# Patient Record
Sex: FEMALE | Race: WHITE
[De-identification: names, ages, dates, MRNs, and addresses within clinical notes are randomized per-mention and may not be internally consistent; named-entity substitution may affect disease eponyms.]

---

## 2019-06-30 ENCOUNTER — HOSPITAL ENCOUNTER (EMERGENCY)
Dept: HOSPITAL 97 - ER | Age: 64
LOS: 1 days | Discharge: HOME | End: 2019-07-01
Payer: COMMERCIAL

## 2019-06-30 DIAGNOSIS — Z88.6: ICD-10-CM

## 2019-06-30 DIAGNOSIS — M25.511: Primary | ICD-10-CM

## 2019-06-30 DIAGNOSIS — Z88.5: ICD-10-CM

## 2019-06-30 LAB
HCT VFR BLD CALC: 37.6 % (ref 36–45)
INR BLD: 0.91
LYMPHOCYTES # SPEC AUTO: 3.9 K/UL (ref 0.7–4.9)
PMV BLD: 7.3 FL (ref 7.6–11.3)
RBC # BLD: 4.33 M/UL (ref 3.86–4.86)

## 2019-06-30 PROCEDURE — 83735 ASSAY OF MAGNESIUM: CPT

## 2019-06-30 PROCEDURE — 71045 X-RAY EXAM CHEST 1 VIEW: CPT

## 2019-06-30 PROCEDURE — 80076 HEPATIC FUNCTION PANEL: CPT

## 2019-06-30 PROCEDURE — 80048 BASIC METABOLIC PNL TOTAL CA: CPT

## 2019-06-30 PROCEDURE — 85025 COMPLETE CBC W/AUTO DIFF WBC: CPT

## 2019-06-30 PROCEDURE — 93005 ELECTROCARDIOGRAM TRACING: CPT

## 2019-06-30 PROCEDURE — 83880 ASSAY OF NATRIURETIC PEPTIDE: CPT

## 2019-06-30 PROCEDURE — 36415 COLL VENOUS BLD VENIPUNCTURE: CPT

## 2019-06-30 PROCEDURE — 99284 EMERGENCY DEPT VISIT MOD MDM: CPT

## 2019-06-30 PROCEDURE — 85610 PROTHROMBIN TIME: CPT

## 2019-06-30 PROCEDURE — 84484 ASSAY OF TROPONIN QUANT: CPT

## 2019-07-01 LAB
ALBUMIN SERPL BCP-MCNC: 4 G/DL (ref 3.4–5)
ALP SERPL-CCNC: 68 U/L (ref 45–117)
ALT SERPL W P-5'-P-CCNC: 19 U/L (ref 12–78)
AST SERPL W P-5'-P-CCNC: 18 U/L (ref 15–37)
BUN BLD-MCNC: 14 MG/DL (ref 7–18)
GLUCOSE SERPLBLD-MCNC: 262 MG/DL (ref 74–106)
MAGNESIUM SERPL-MCNC: 2.3 MG/DL (ref 1.8–2.4)
NT-PROBNP SERPL-MCNC: 65 PG/ML (ref ?–125)
POTASSIUM SERPL-SCNC: 4.6 MMOL/L (ref 3.5–5.1)
TROPONIN (EMERG DEPT USE ONLY): < 0.02 NG/ML (ref 0–0.04)

## 2019-07-01 NOTE — RAD REPORT
EXAM DESCRIPTION:  Shoulder  Right 2 View - 6/30/2019 11:44 pm

 

CLINICAL HISTORY:  Right shoulder pain, suspected rotator cuff tear

 

COMPARISON:  None.

 

TECHNIQUE:  Internal and external rotation views of the right shoulder were obtained.

 

FINDINGS:  There is no fracture or dislocation.  AC joint degenerative changes are present mild for a
ge. Degenerative changes seen along the undersurface of the acromion. Acromial humeral joint space is
 normal. There are faint calcifications likely related to a calcific tendinitis. Minimal spurring is 
seen at the greater tuberosity. No acute or suspicious findings.

 

IMPRESSION:  Right shoulder degenerative changes are present without an acute finding seen.

 

Concerns for rotator cuff tear can be addressed with MR imaging.

## 2019-07-01 NOTE — EKG
Test Date:    2019-06-30               Test Time:    23:23:14

Technician:   MG                                     

                                                     

MEASUREMENT RESULTS:                                       

Intervals:                                           

Rate:         82                                     

NJ:           210                                    

QRSD:         78                                     

QT:           382                                    

QTc:          446                                    

Axis:                                                

P:            49                                     

NJ:           210                                    

QRS:          -24                                    

T:            30                                     

                                                     

INTERPRETIVE STATEMENTS:                                       

                                                     

Sinus rhythm with 1st degree AV block

Septal infarct, age undetermined

Abnormal ECG

Compared to ECG 12/11/1999 20:45:00

First degree AV block now present

Myocardial infarct finding now present



Electronically Signed On 07-01-19 19:40:41 CDT by José Manuel Marcelo

## 2019-07-01 NOTE — EDPHYS
Physician Documentation                                                                           

 [facility]                                                                 

Name: Lata Lal                                                                             

Age: 63 yrs                                                                                       

Sex: Female                                                                                       

: 1955                                                                                   

MRN: [id number]                                                                                   

Arrival Date: 2019                                                                          

Time: 22:50                                                                                       

Account#: [id number]                                                                            

Bed 26                                                                                            

Private MD: Gerald Rios R                                                                       

ED Physician Beny Salas                                                                       

HPI:                                                                                              

                                                                                             

23:25 This 63 yrs old  Female presents to ER via Ambulatory with complaints of Right pm1 

      Shoulder pain.                                                                              

23:25 The patient or guardian complains of pain, that is acute. The complaints affect the     pm1 

      anterior aspect of right shoulder. Context: The problem was sustained at home, resulted     

      from attempting to spank grandchild. Onset: The symptoms/episode began/occurred today.      

      Treatment prior to arrival includes: no previous treatment. Modifying factors: The          

      symptoms are alleviated by remaining still, the symptoms are aggravated by movement.        

      Associated signs and symptoms: Pertinent negatives: deformity, fever, numbness,             

      swelling, tingling. Severity of symptoms: in the emergency department the symptoms are      

      unchanged. The patient has experienced similar episodes in the past, prior history of       

      injured bilateral rotator cuffs. The patient has not recently seen a physician.             

                                                                                                  

Historical:                                                                                       

- Allergies:                                                                                      

23:15 Morphine;                                                                               mg2 

23:15 Tramadol HCl;                                                                           mg2 

23:15 morphine derivatives;                                                                   mg2 

- PMHx:                                                                                           

23:15 Diabetes;                                                                               mg2 

- PSHx:                                                                                           

23:15 Carpal Tunnel Repair; ;                                                        mg2 

                                                                                                  

- Immunization history:: Flu vaccine is up to date.                                               

- Social history:: Smoking status: Patient/guardian denies using tobacco,                         

  Patient/guardian denies using alcohol, street drugs, IV drugs.                                  

- Ebola Screening: : No symptoms or risks identified at this time.                                

                                                                                                  

                                                                                                  

ROS:                                                                                              

23:25 Constitutional: Negative for fever, chills, and weight loss, Eyes: Negative for injury, pm1 

      pain, redness, and discharge, ENT: Negative for injury, pain, and discharge, Neck:          

      Negative for injury, pain, and swelling, Cardiovascular: Negative for chest pain,           

      palpitations, and edema, Respiratory: Negative for shortness of breath, cough,              

      wheezing, and pleuritic chest pain, Abdomen/GI: Negative for abdominal pain, nausea,        

      vomiting, diarrhea, and constipation, Back: Negative for injury and pain, : Negative      

      for injury, bleeding, discharge, and swelling, MS/Extremity: Negative for injury and        

      deformity, Skin: Negative for injury, rash, and discoloration.                              

23:25 Neuro: Positive for dizziness, for the past month with exertion, Negative for headache,     

      numbness, tingling.                                                                         

                                                                                                  

Exam:                                                                                             

23:25 Constitutional:  This is a well developed, well nourished patient who is awake, alert,  pm1 

      and in no acute distress. Head/Face:  Normocephalic, atraumatic. Eyes:  Pupils equal        

      round and reactive to light, extra-ocular motions intact.  Lids and lashes normal.          

      Conjunctiva and sclera are non-icteric and not injected.  Cornea within normal limits.      

      Periorbital areas with no swelling, redness, or edema. ENT:  Nares patent. No nasal         

      discharge, no septal abnormalities noted.  Tympanic membranes are normal and external       

      auditory canals are clear.  Oropharynx with no redness, swelling, or masses, exudates,      

      or evidence of obstruction, uvula midline.  Mucous membranes moist. Neck:  Trachea          

      midline, no thyromegaly or masses palpated, and no cervical lymphadenopathy.  Supple,       

      full range of motion without nuchal rigidity, or vertebral point tenderness.  No            

      Meningismus. Chest/axilla:  Normal chest wall appearance and motion.  Nontender with no     

      deformity.  No lesions are appreciated. Cardiovascular:  Regular rate and rhythm with a     

      normal S1 and S2.  No gallops, murmurs, or rubs.  Normal PMI, no JVD.  No pulse             

      deficits. Respiratory:  Lungs have equal breath sounds bilaterally, clear to                

      auscultation and percussion.  No rales, rhonchi or wheezes noted.  No increased work of     

      breathing, no retractions or nasal flaring. Abdomen/GI:  Soft, non-tender, with normal      

      bowel sounds.  No distension or tympany.  No guarding or rebound.  No evidence of           

      tenderness throughout. Back:  No spinal tenderness.  No costovertebral tenderness.          

      Full range of motion. Skin:  Warm, dry with normal turgor.  Normal color with no            

      rashes, no lesions, and no evidence of cellulitis.                                          

23:25 Musculoskeletal/extremity: Extremities: grossly normal except: noted in the anterior        

      aspect of right shoulder: pain with attempting to move arm up to shoulder level.  able      

      to move down right arm smoothly and slowly, There is no evidence of  deformity,             

      Circulation is intact in all extremities.                                                   

23:25 Neuro: Orientation: is normal, Cranial nerves: grossly normal, CN II- XII are normal as     

      tested, Motor: no acute changes, moves all fours, Sensation: is normal, no obvious          

      gross deficits, Gait: is steady, at a normal pace, without difficulty.                      

                                                                                                  

Vital Signs:                                                                                      

23:00  / 83; Pulse 81; Resp 20; Temp 98.4(O); Pulse Ox 98% ; Weight 67.13 kg; Height 5  mg2 

      ft. 2 in. (157.48 cm); Pain 8/10;                                                           

23:42  / 78; Pulse 80; Resp 18; Pulse Ox 97% on R/A;                                    mg2 

                                                                                             

00:04  / 85 Supine; Pulse 79;                                                           lt1 

00:04  / 83 Sitting; Pulse 80;                                                          lt1 

00:04  / 77 Standing; Pulse 82;                                                         lt1 

                                                                                             

23:00 Body Mass Index 27.07 (67.13 kg, 157.48 cm)                                             mg2 

                                                                                                  

MDM:                                                                                              

                                                                                             

23:07 Patient medically screened.                                                             pm1 

                                                                                             

00:29 Data reviewed: vital signs. Data interpreted: Pulse oximetry: on room air is 97 %.      pm1 

      Interpretation: normal. Counseling: I had a detailed discussion with the patient and/or     

      guardian regarding: the historical points, exam findings, and any diagnostic results        

      supporting the discharge/admit diagnosis, lab results, radiology results, the need for      

      outpatient follow up, a orthopedic surgeon, to return to the emergency department if        

      symptoms worsen or persist or if there are any questions or concerns that arise at home.    

                                                                                                  

                                                                                             

23:17 Order name: Basic Metabolic Panel                                                       pm1 

                                                                                             

23:17 Order name: CBC with Diff; Complete Time: 00:27                                         pm                                                                                             

23:17 Order name: LFT's                                                                       pm                                                                                             

23:17 Order name: Magnesium                                                                   pm1 

                                                                                             

23:17 Order name: NT PRO-BNP; Complete Time: 00:27                                            pm1 

                                                                                             

23:17 Order name: PT-INR; Complete Time: 00:27                                                pm1 

                                                                                             

23:17 Order name: Troponin (emerg Dept Use Only); Complete Time: 00:27                        pm                                                                                             

23:17 Order name: XRAY Chest (1 view)                                                         pm1 

                                                                                             

23:17 Order name: EKG; Complete Time: 23:19                                                   pm1 

06/30                                                                                             

23:17 Order name: Shoulder Right (2 View) XRAY                                                pm1 

                                                                                             

23:18 Order name: Basic Metabolic Panel; Complete Time: 00:27                                 EDMS

                                                                                             

23:18 Order name: Liver (Hepatic) Function; Complete Time: 00:27                              EDMS

                                                                                             

23:18 Order name: Magnesium; Complete Time: 00:27                                             EDMS

                                                                                             

23:17 Order name: Cardiac monitoring; Complete Time: 23:41                                    pm1 

                                                                                             

23:17 Order name: EKG - Nurse/Tech; Complete Time: 23:41                                      pm1 

                                                                                             

23:17 Order name: IV Saline Lock; Complete Time: 23:41                                        pm1 

                                                                                             

23:17 Order name: Labs collected and sent; Complete Time: 23:41                               pm1 

                                                                                             

23:17 Order name: O2 Per Protocol; Complete Time: 23:41                                       pm1 

                                                                                             

23:17 Order name: O2 Sat Monitoring; Complete Time: 23:41                                     pm1 

                                                                                             

23:17 Order name: Orthostatic Blood Pressure; Complete Time: 00:02                            pm1 

                                                                                             

23:28 Order name: Sling; Complete Time: 00:37                                                 pm1 

                                                                                                  

Administered Medications:                                                                         

01:20 Drug: lidocaine 5% patch 1 patches Route: Topical; Site: affected area;                 la1 

                                                                                                  

                                                                                                  

Disposition:                                                                                      

19 00:47 Discharged to Home. Impression: Pain in right shoulder.                            

- Condition is Stable.                                                                            

- Discharge Instructions: Shoulder Pain, How to Use a Sling.                                      

- Prescriptions for Diclofenac Sodium 75 mg Oral Tablet Sustained Release - take 1                

  tablet by ORAL route 2 times per day; 30 tablet.                                                

- Medication Reconciliation Form, Thank You Letter, Antibiotic Education, Prescription            

  Opioid Use form.                                                                                

- Follow up: Emergency Department; When: As needed; Reason: Worsening of condition.               

  Follow up: Private Physician; When: 2 - 3 days; Reason: Recheck today's complaints,             

  Continuance of care, Re-evaluation by your physician.                                           

- Problem is new.                                                                                 

- Symptoms have improved.                                                                         

                                                                                                  

                                                                                                  

                                                                                                  

Signatures:                                                                                       

Dispatcher [employer]                           EDMS                                                 

Gautam Pickering RN                         RN   la1                                                  

Javier Jimenez, MEKA                    NP   pm1                                                  

Gregg Nunez RN                    RN   mg2                                                  

                                                                                                  

Corrections: (The following items were deleted from the chart)                                    

01:21 00:47 2019 00:47 Discharged to Home. Impression: Pain in right shoulder.          la1 

      Condition is Stable. Forms are Medication Reconciliation Form, Thank You Letter,            

      Antibiotic Education, Prescription Opioid Use. Follow up: Emergency Department; When:       

      As needed; Reason: Worsening of condition. Follow up: Private Physician; When: 2 - 3        

      days; Reason: Recheck today's complaints, Continuance of care, Re-evaluation by your        

      physician. Problem is new. Symptoms have improved. pm1                                      

                                                                                                  

**************************************************************************************************

## 2019-07-01 NOTE — ER
Nurse's Notes                                                                                     

 [facility]                                                                 

Name: Lata Lal                                                                             

Age: 63 yrs                                                                                       

Sex: Female                                                                                       

: 1955                                                                                   

MRN: [id number]                                                                                   

Arrival Date: 2019                                                                          

Time: 22:50                                                                                       

Account#: [id number]                                                                            

Bed 26                                                                                            

Private MD: Gerald Rios R                                                                       

Diagnosis: Pain in right shoulder                                                                 

                                                                                                  

Presentation:                                                                                     

                                                                                             

23:11 Presenting complaint: Patient states: i have rotator cuff pain in both arms radiating   mg2 

      to my neck that has been going on for few days now but aggravated by physical activity      

      today like planting of grass. pain is more in the right arm. Transition of care:            

      patient was not received from another setting of care. Onset of symptoms was 2019. Risk Assessment: Do you want to hurt yourself or someone else? Patient reports no     

      desire to harm self or others. Initial Sepsis Screen: Does the patient meet any 2           

      criteria? No. Patient's initial sepsis screen is negative. Does the patient have a          

      suspected source of infection? No. Patient's initial sepsis screen is negative. Care        

      prior to arrival: None.                                                                     

23:11 Method Of Arrival: Ambulatory                                                           mg2 

23:11 Acuity: BRO 3                                                                           mg2 

                                                                                                  

Triage Assessment:                                                                                

23:16 General: Appears in no apparent distress. comfortable, Behavior is calm, cooperative.   mg2 

      Pain: Complains of pain in both arms Pain radiates to neck Pain currently is 8 out of       

      10 on a pain scale. Quality of pain is described as aching, Pain began gradually, 2-3       

      days ago. Is intermittent. EENT: No deficits noted. Neuro: Level of Consciousness is        

      awake, alert, obeys commands, Oriented to person, place, time, situation.                   

      Cardiovascular: Capillary refill < 3 seconds Patient's skin is warm and dry.                

      Respiratory: Airway is patent Respiratory effort is even, unlabored, Respiratory            

      pattern is regular, symmetrical. GI: No signs and/or symptoms were reported involving       

      the gastrointestinal system. : No signs and/or symptoms were reported regarding the       

      genitourinary system. Derm: Skin is intact, is healthy with good turgor, Skin is pink,      

      warm \T\ dry. normal. Musculoskeletal: Circulation, motion, and sensation intact.           

      Capillary refill < 3 seconds, Reports pain in right arm and left arm.                       

                                                                                                  

Historical:                                                                                       

- Allergies:                                                                                      

23:15 Morphine;                                                                               mg2 

23:15 Tramadol HCl;                                                                           mg2 

23:15 morphine derivatives;                                                                   mg2 

- PMHx:                                                                                           

23:15 Diabetes;                                                                               mg2 

- PSHx:                                                                                           

23:15 Carpal Tunnel Repair; ;                                                        mg2 

                                                                                                  

- Immunization history:: Flu vaccine is up to date.                                               

- Social history:: Smoking status: Patient/guardian denies using tobacco,                         

  Patient/guardian denies using alcohol, street drugs, IV drugs.                                  

- Ebola Screening: : No symptoms or risks identified at this time.                                

                                                                                                  

                                                                                                  

Screenin:19 Abuse screen: Denies threats or abuse. Denies injuries from another. Nutritional        mg2 

      screening: No deficits noted. Tuberculosis screening: No symptoms or risk factors           

      identified. Fall Risk None identified.                                                      

                                                                                                  

Assessment:                                                                                       

23:18 Reassessment: pls see triage assessment.                                                mg2 

                                                                                                  

Vital Signs:                                                                                      

23:00  / 83; Pulse 81; Resp 20; Temp 98.4(O); Pulse Ox 98% ; Weight 67.13 kg; Height 5  mg2 

      ft. 2 in. (157.48 cm); Pain 8/10;                                                           

23:42  / 78; Pulse 80; Resp 18; Pulse Ox 97% on R/A;                                    mg2 

                                                                                             

00:04  / 85 Supine; Pulse 79;                                                           lt1 

00:04  / 83 Sitting; Pulse 80;                                                          lt1 

00:04  / 77 Standing; Pulse 82;                                                         lt1 

                                                                                             

23:00 Body Mass Index 27.07 (67.13 kg, 157.48 cm)                                             mg2 

                                                                                                  

ED Course:                                                                                        

                                                                                             

22:50 Patient arrived in ED.                                                                  do  

22:50 Gerald Rios MD is Private Physician.                                                  do  

22:58 Javier Jimenez NP is PHCP.                                                           pm1 

22:58 Beny Salas MD is Attending Physician.                                              pm1 

23:11 Gregg Nunez RN is Primary Nurse.                                                  mg2 

23:14 Triage completed.                                                                       mg2 

23:16 Arm band placed on.                                                                     mg2 

23:19 Patient has correct armband on for positive identification. Pulse ox on. NIBP on. Door  mg2 

      closed. Warm blanket given.                                                                 

23:41 No provider procedures requiring assistance completed. Inserted saline lock: 20 gauge   mg2 

      in left antecubital area, using aseptic technique. Blood collected.                         

23:44 XRAY Chest (1 view) In Process Unspecified.                                             EDMS

23:44 Shoulder Right (2 View) XRAY In Process Unspecified.                                    EDMS

                                                                                             

00:37 Sling applied to right arm.                                                             mg2 

01:20 IV discontinued, intact, bleeding controlled, No redness/swelling at site. Pressure     la1 

      dressing applied.                                                                           

                                                                                                  

Administered Medications:                                                                         

01:20 Drug: lidocaine 5% patch 1 patches Route: Topical; Site: affected area;                 la1 

                                                                                                  

                                                                                                  

Outcome:                                                                                          

00:47 Discharge ordered by MD.                                                                pm1 

01:20 Discharged to home ambulatory.                                                          la1 

01:20 Condition: stable                                                                           

01:20 Discharge instructions given to patient, Instructed on discharge instructions, follow       

      up and referral plans. medication usage, Demonstrated understanding of instructions,        

      follow-up care, medications, Prescriptions given X 1.                                       

01:21 Patient left the ED.                                                                    la1 

                                                                                                  

Signatures:                                                                                       

Dispatcher [employer]                           EDMS                                                 

Gautam Pickering RN RN   la1                                                  

Wendy Rodriguez Patrick, NP                    NP   pm1                                                  

Gregg Nunez RN RN   mg2                                                  

Nini De                                   lt1                                                  

                                                                                                  

Corrections: (The following items were deleted from the chart)                                    

                                                                                             

23:16 23:11 Acuity: BRO 4 mg2                                                                 mg2 

23:19 23:00  / 83; Pulse 81bpm; Resp 20bpm; Pulse Ox 98%; Temp 98.4F Oral; lt1          mg2 

                                                                                                  

**************************************************************************************************

## 2019-07-01 NOTE — RAD REPORT
EXAM DESCRIPTION:  RAD - Chest Single View - 6/30/2019 11:44 pm

 

CLINICAL HISTORY:  Chest pain, bilateral shoulder pain

 

COMPARISON:  None.

 

TECHNIQUE:  AP portable chest image was obtained 2335 hours .

 

FINDINGS:  Lungs are clear. Heart and vasculature are normal. No measurable pleural effusion and no p
neumothorax. No acute bony abnormality seen. No acute aortic findings suspected.

 

IMPRESSION:  No acute cardiopulmonary process.

## 2020-01-12 ENCOUNTER — HOSPITAL ENCOUNTER (EMERGENCY)
Dept: HOSPITAL 97 - ER | Age: 65
Discharge: HOME | End: 2020-01-12
Payer: COMMERCIAL

## 2020-01-12 VITALS — TEMPERATURE: 97.2 F | SYSTOLIC BLOOD PRESSURE: 140 MMHG | OXYGEN SATURATION: 100 % | DIASTOLIC BLOOD PRESSURE: 77 MMHG

## 2020-01-12 DIAGNOSIS — I10: ICD-10-CM

## 2020-01-12 DIAGNOSIS — Z79.4: ICD-10-CM

## 2020-01-12 DIAGNOSIS — E11.9: ICD-10-CM

## 2020-01-12 DIAGNOSIS — Z88.5: ICD-10-CM

## 2020-01-12 DIAGNOSIS — R53.1: Primary | ICD-10-CM

## 2020-01-12 DIAGNOSIS — Z88.6: ICD-10-CM

## 2020-01-12 LAB
ALBUMIN SERPL BCP-MCNC: 4 G/DL (ref 3.4–5)
ALP SERPL-CCNC: 67 U/L (ref 45–117)
ALT SERPL W P-5'-P-CCNC: 25 U/L (ref 12–78)
AST SERPL W P-5'-P-CCNC: 21 U/L (ref 15–37)
BUN BLD-MCNC: 11 MG/DL (ref 7–18)
GLUCOSE SERPLBLD-MCNC: 302 MG/DL (ref 74–106)
HCT VFR BLD CALC: 41.6 % (ref 36–45)
LIPASE SERPL-CCNC: 77 U/L (ref 73–393)
LYMPHOCYTES # SPEC AUTO: 2 K/UL (ref 0.7–4.9)
PMV BLD: 7.2 FL (ref 7.6–11.3)
POTASSIUM SERPL-SCNC: 4.6 MMOL/L (ref 3.5–5.1)
RBC # BLD: 4.79 M/UL (ref 3.86–4.86)

## 2020-01-12 PROCEDURE — 85025 COMPLETE CBC W/AUTO DIFF WBC: CPT

## 2020-01-12 PROCEDURE — 36415 COLL VENOUS BLD VENIPUNCTURE: CPT

## 2020-01-12 PROCEDURE — 99284 EMERGENCY DEPT VISIT MOD MDM: CPT

## 2020-01-12 PROCEDURE — 83690 ASSAY OF LIPASE: CPT

## 2020-01-12 PROCEDURE — 80048 BASIC METABOLIC PNL TOTAL CA: CPT

## 2020-01-12 PROCEDURE — 71045 X-RAY EXAM CHEST 1 VIEW: CPT

## 2020-01-12 PROCEDURE — 96375 TX/PRO/DX INJ NEW DRUG ADDON: CPT

## 2020-01-12 PROCEDURE — 96361 HYDRATE IV INFUSION ADD-ON: CPT

## 2020-01-12 PROCEDURE — 96374 THER/PROPH/DIAG INJ IV PUSH: CPT

## 2020-01-12 PROCEDURE — 70450 CT HEAD/BRAIN W/O DYE: CPT

## 2020-01-12 PROCEDURE — 82947 ASSAY GLUCOSE BLOOD QUANT: CPT

## 2020-01-12 PROCEDURE — 80076 HEPATIC FUNCTION PANEL: CPT

## 2020-01-12 NOTE — XMS REPORT
Summary of Care

 Created on:2019



Patient:Lata Lal

Sex:Female

:1955

External Reference #:[id number]





Demographics







 Address  [address] [place] [address]

 

 Mobile Phone  [phone]

 

 Home Phone  [phone]

 

 Phone  [phone]

 

 Email Address  [email or web address]

 

 Preferred Language  English

 

 Marital Status  

 

 Synagogue Affiliation  Unknown

 

 Race  White

 

 Ethnic Group  Not  or 









Author







 Organization  [facility]

 

 Address  [address] [place] [address]









Support







 Name  Relationship  Address  Phone

 

 Darrian Lal  Unavailable  [address] [facility]  [phone]



     [place] [address]  









Care Team Providers







 Name  Role  Phone

 

 Reynaldo Rooney MD  Primary Care Provider  [phone]









Reason for Referral

MRI/CAT Scan (Routine)





 Status  Reason  Specialty  Diagnoses /  Referred By  Referred To



       Procedures  Contact  Contact

 

 New Request    Diagnostic  Diagnoses



Adhesive capsulitis of right shoulder  Reynaldo Rooney  



     Radiology  



Procedures



MR SHOULDER RIGHT WO CONTRAST  MD YANNICK



  



         [address] [place] [address]  



         



  



         [address]



  



         [place] [address]



  



         Phone:  



         [phone]



  



         Fax:  



         [phone]  





 (Routine)





 Status  Reason  Specialty  Diagnoses /  Referred By  Referred To



       Procedures  Contact  Contact

 

 New Request  Location  Psychiatry  Diagnoses



Major depressive disorder with current active episode, unspecified depression 
episode severity, unspecified whether recurrent  Reynaldo Rooney  



   Preference    



Procedures



CONSULT/REFERRAL PSYCHOLOGY  MD YANNICK



  



         [address] [place] [address]  



         



  



         [address]



  



         [place]  



         [address]



  



         Phone:  



         [phone]



  



         Fax:  



         [phone]  









Reason for Visit







 Reason  Comments

 

 REFERRAL  Ortho

 

 Diabetes  

 

 Shoulder Pain  7/10

 

 Pelvic Pain  right side

 

 Depression  







Encounter Details







 Date  Type  Department  Care Team  Description

 

 2019  Office Visit  [facility]  Reynaldo Rooney III,  Adhesive 
capsulitis of right shoulder (Primary Dx);



     and Adult Primary  MD



  Major depressive disorder with current active episode, unspecified depression 
episode severity, unspecified whether recurrent



     Care- [address] 



  



     [address] [place] [address] ,  [address]



  



     [address]



  [place] [address]



  



     [place]  [phone]



  



     [phone] (Fax)  



     [phone]    







Allergies







 Active Allergy  Reactions  Severity  Noted Date  Comments

 

 Morphine  Rash    2018  

 

 Tramadol  Nausea and/or Vomiting  High  2018  



documented as of this encounter (statuses as of 2019)



Medications







 Medication  Sig  Dispensed  Refills  Start Date  End Date  Status

 

 Insulin Detemir  inject 42 Units  1 Box  11  2018    Active



 (LEVEMIR FLEXTOUCH  under the skin          



 U-100 INSULN) 100  daily.          



 unit/mL (3 mL)            



 injection            

 

 Insulin Needles,  Use as directed  1 Box  11  2018    Active



 Disposable, (ADVOCATE            



 PEN NEEDLE) 31 gauge x            



 3/16" Ndle            

 

 nitroglycerin  Place 1 tablet  30 tablet  1  2019    Active



 (NITROSTAT) 0.4 mg  under the tongue          



 sublingual tablet  every  5 (five)          



   minutes as          



   needed for Chest          



   pain.          

 

 lisinopril 10 mg tablet  Take 1 tablet by  30 tablet  11  2019    Active



   mouth daily.          

 

 Insulin Lispro, Human,  inject 5-15  1 Box  11  2019    Active



 (HUMALOG U-100 INSULIN)  Units under the          



 100 unit/mL cartridge  skin before          



   meals.          

 

 ezetimibe 10 mg  Take 1 tablet by  30 tablet  3  2019    Active



 tabletIndications:  mouth daily.          



 Mixed hyperlipidemia            

 

 FLUoxetine 10 mg  Take 1 capsule  30 capsule  11  2019    Active



 capsuleIndications:  by mouth daily.          



 Major depressive            



 disorder with current            



 active episode,            



 unspecified depression            



 episode severity,            



 unspecified whether            



 recurrent            









 Hospital, Clinic,  Ordered Dose  Route  Frequency  Start Date  End Date  Status



 or Other Facility            



 Administered            



 Medication            

 

 triamcinolone  40 mg  Intra-artic  ONCE  2019  Ended



 acetonide (KENALOG)    u      9  



 injection 40 mg            

 

 triamcinolone  40 mg  Intra-artic  ONCE  2019  Discontinued



 acetonide (KENALOG)    u      9  



 injection 40 mg            



documented as of this encounter (statuses as of 2019)



Active Problems







 Problem  Noted Date

 

 Type 2 diabetes mellitus without complication, with long-term current use  



 of insulin  

 

 Essential hypertension  2019

 

 Hyperlipidemia, unspecified hyperlipidemia type  2019



documented as of this encounter (statuses as of 2019)



Immunizations







 Name  Administration Dates  Next Due

 

 Influenza Virus Vaccine Quad IM 3+ YRS  2019  



documented as of this encounter



Social History







 Tobacco Use  Types  Packs/Day  Years Used  Date

 

 Never Smoker        









 Smokeless Tobacco: Never Used      









 Alcohol Use  Drinks/Week  oz/Week  Comments

 

 No      









 Sex Assigned at Birth  Date Recorded

 

 Not on file  









 Job Start Date  Occupation  Industry

 

 Not on file  Not on file  Not on file









 Travel History  Travel Start  Travel End









 No recent travel history available.



documented as of this encounter



Last Filed Vital Signs







 Vital Sign  Reading  Time Taken  Comments

 

 Blood Pressure  138/79  2019  8:57 AM CDT  

 

 Pulse  82  2019  8:57 AM CDT  

 

 Temperature  36.8 C (98.3 F)  2019  8:57 AM CDT  

 

 Respiratory Rate  18  2019  8:57 AM CDT  

 

 Oxygen Saturation  96%  2019  8:57 AM CDT  

 

 Inhaled Oxygen Concentration  -  -  

 

 Weight  69.5 kg (153 lb 3.2 oz)  2019  8:57 AM CDT  

 

 Height  157.5 cm (5' 2")  2019  8:57 AM CDT  

 

 Body Mass Index  28.02  2019  8:57 AM CDT  



documented in this encounter



Progress Notes

Reynaldo Rooney III, MD - 2019  8:40 AM CDTFormatting of this note might be 
different from the original.

Cc:

Chief Complaint

Patient presents with

 REFERRAL

  Ortho

 Diabetes

 Shoulder Pain

  7/10

 Pelvic Pain

  right side

 Depression



Lata Lal is a 63 year old female.

Shoulder Pain

The pain is present in the right shoulder. This is a recurrent problem. The 
current episode started more than 1 year ago. There has been no history of 
extremity trauma. The problem occurs intermittently. The problem has been 
gradually worsening. The quality of the pain is described as aching. The painis 
moderate. Associated symptoms include a limited range of motion. Pertinent 
negatives include no fever. The treatment provided mild relief.



Medications

Outpatient Medications Prior to Visit

Medication Sig Dispense Refill

 Insulin Lispro, Human, (HUMALOG U-100 INSULIN) 100 unit/mL cartridge inject 
5-15 Units under theskin before meals. 1 Box 11

 lisinopril 10 mg tablet Take 1 tablet by mouth daily. 30 tablet 11

 Insulin Detemir (LEVEMIR FLEXTOUCH U-100 INSULN) 100 unit/mL (3 mL) 
injection inject 42 Units under the skin daily. 1 Box 11

 Insulin Needles, Disposable, (ADVOCATE PEN NEEDLE) 31 gauge x 3/16" Ndle 
Use as directed 1 Box 11

 ezetimibe 10 mg tablet Take 1 tablet by mouth daily. 30 tablet 3

 nitroglycerin (NITROSTAT) 0.4 mg sublingual tablet Place 1 tablet under the 
tongue every  5 (five) minutes as needed for Chest pain. 30 tablet 1



No facility-administered medications prior to visit.



Review of Systems

Constitutional: Negative for fever and unexpected weight change.

Respiratory: Negative for cough and shortness of breath.

Cardiovascular: Negative for leg swelling.

Genitourinary: Positive for pelvic pain.

Musculoskeletal: Positive for arthralgias.

Skin: Negative for rash.

Neurological: Negative for headaches.

Psychiatric/Behavioral: Positive for dysphoric mood and sleep disturbance.

Hematological: Negative for adenopathy. Does not bruise/bleed easily.



Vital Signs

/79 (BP Location: Left arm, Patient Position: Sitting, BP CUFF SIZE: 
Adult Medium)  | Pulse 82 | Temp 36.8 C (98.3 F) (Oral)  | Resp 18  | Ht 5' 
2" (1.575 m)  | Wt 153 lb 3.2 oz (69.5 kg)  |SpO2 96%  | BMI 28.02 kg/m



Physical Exam

Constitutional: She is oriented to person, place, and time. No distress.

Overweight female

HENT:

Head: Normocephalic.

Eyes: Pupils are equal, round, and reactive to light.

Neck: Normal range of motion.

Cardiovascular: Normal rate and regular rhythm.

Pulmonary/Chest: Effort normal.

Musculoskeletal: She exhibits no edema.

Very limited motion right shoulder, diffuse pain, left ok,

Neurological: She is alert and oriented to person, place, and time.

Skin: Skin is warm and dry. No rash noted.

Psychiatric:

depressed

Vitals reviewed.



Assessment/Plan

1. Major depressive disorder with current active episode, unspecified 
depression episode severity, unspecified whether recurrent



- CONSULT/REFERRAL PSYCHOLOGY

- FLUoxetine 10 mg capsule; Take 1 capsule by mouth daily.  Dispense: 30 capsule
; Refill: 11

2. Adhesive capsulitis

After verbal consent , right shoulder prepped with betadine, injected with 
posterior approach, with 40mg kenalog and 4ml of 1 % lidocaine. Patient 
tolerated well, cold compress on and off, then gentle rom exercises

Will get mr of shoulder to evaluate tearsElectronically signed by Reynaldo Rooney III, MD at 2019  9:49 AM CDTdocumented in this encounter



Plan of Treatment







 Name  Type  Priority  Associated Diagnoses  Order Schedule

 

 MR SHOULDER RIGHT WO  IMAGING  Routine  Adhesive capsulitis of  Expected: ,



 CONTRAST      right shoulder  Expires: 2020









 Health Maintenance  Due Date  Last Done  Comments

 

 PNEUMOCOCCAL 0-64 YEARS COMBINED SERIES (1  1960    



 of 1 - PPSV23)      

 

 EYE EXAM  1964    

 

 URINE MICROALBUMIN  1964    

 

 DTaP,Tdap,and Td Vaccines (1 - Tdap)  1973    

 

 PAP SMEAR  1975    

 

 COLONOSCOPY  2004    

 

 Zoster Recombinant Vaccine (SHINGRIX) (1  2004    



 of 2)      

 

 HgA1C  2019  

 

 INFLUENZA VACCINE (#1)  2019  

 

 CREATININE (SERUM)  2020  

 

 LDL-C  2020  

 

 MAMMOGRAM  01/10/2020  01/10/2019  

 

 FOOT EXAM  2020, 2019  

 

 HEPATITIS C (HCV) SCREEN  Completed  2019  



documented as of this encounter



Results

Not on filedocumented in this encounter



Visit Diagnoses







 Diagnosis

 

 Adhesive capsulitis of right shoulder - Primary







 Adhesive capsulitis of shoulder

 

 Major depressive disorder with current active episode, unspecified depression 
episode



 severity, unspecified whether recurrent



documented in this encounter



Administered Medications







 Medication Order  MAR Action  Action Date  Dose  Rate  Site

 

 triamcinolone acetonide (KENALOG)  Given  2019  9:42 AM CDT  40 mg    



 injection 40 mg



          



 40 mg, Intra-articular, ONCE, 1          



 dose, Tue 19 at 1045, Routine          









   



documented in this encounter



Insurance







 Payer  Benefit Plan / Group  Subscriber ID  Effective Dates  Phone  Address  
Type

 

 [employer]  [facility]  [id number]  2018-Present      HMO









 Guarantor Name  Account Type  Relation to  Date of  Phone  Billing Address



     Patient  Birth    

 

 Lata Lal  Personal/Famil  Self  1955  [phone]  [address]



   y      7 (Home)  [place]



           [address]



documented as of this encounter

## 2020-01-12 NOTE — XMS REPORT
Summary of Care

 Created on:2019



Patient:Lata Lal

Sex:Female

:1955

External Reference #:[id number]





Demographics







 Address  [address] [place] [address]

 

 Mobile Phone  [phone]

 

 Home Phone  [phone]

 

 Phone  [phone]

 

 Email Address  [email or web address]

 

 Preferred Language  English

 

 Marital Status  

 

 Worship Affiliation  Unknown

 

 Race  White

 

 Ethnic Group  Not  or 









Author







 Organization  [facility]

 

 Address  [address] [place] [address]









Support







 Name  Relationship  Address  Phone

 

 Darrian Lal  Unavailable  [address] [facility]  [phone]



     [place] [address]  









Care Team Providers







 Name  Role  Phone

 

 Reynaldo Rooney MD  Primary Care Provider  [phone]









Reason for Referral

MRI/CAT Scan (Routine)





 Status  Reason  Specialty  Diagnoses /  Referred By  Referred To



       Procedures  Contact  Contact

 

 New Request    Diagnostic  Diagnoses



Adhesive capsulitis of right shoulder  Reynaldo Rooney  



     Radiology  



Procedures



MR SHOULDER RIGHT WO CONTRAST  MD YANNICK



  



         [address] [place] [address]  



         



  



         [address]



  



         [place] [address]



  



         Phone:  



         [phone]



  



         Fax:  



         [phone]  





 (Routine)





 Status  Reason  Specialty  Diagnoses /  Referred By  Referred To



       Procedures  Contact  Contact

 

 New Request  Location  Psychiatry  Diagnoses



Major depressive disorder with current active episode, unspecified depression 
episode severity, unspecified whether recurrent  Reynaldo Rooney  



   Preference    



Procedures



CONSULT/REFERRAL PSYCHOLOGY  MD YANNICK



  



         [address] [place] [address]  



         



  



         [address]



  



         [place]  



         [address]



  



         Phone:  



         [phone]



  



         Fax:  



         [phone]  









Reason for Visit







 Reason  Comments

 

 REFERRAL  Ortho

 

 Diabetes  

 

 Shoulder Pain  7/10

 

 Pelvic Pain  right side

 

 Depression  







Encounter Details







 Date  Type  Department  Care Team  Description

 

 2019  Office Visit  [facility]  Reynaldo Rooney III,  Adhesive 
capsulitis of right shoulder (Primary Dx);



     and Adult Primary  MD



  Major depressive disorder with current active episode, unspecified depression 
episode severity, unspecified whether recurrent



     Care- [address] 



  



     [address] [place] [address] ,  [address]



  



     [address]



  [place] [address]



  



     [place]  [phone]



  



     [phone] (Fax)  



     [phone]    







Allergies







 Active Allergy  Reactions  Severity  Noted Date  Comments

 

 Morphine  Rash    2018  

 

 Tramadol  Nausea and/or Vomiting  High  2018  



documented as of this encounter (statuses as of 2019)



Medications







 Medication  Sig  Dispensed  Refills  Start Date  End Date  Status

 

 Insulin Detemir  inject 42 Units  1 Box  11  2018    Active



 (LEVEMIR FLEXTOUCH  under the skin          



 U-100 INSULN) 100  daily.          



 unit/mL (3 mL)            



 injection            

 

 Insulin Needles,  Use as directed  1 Box  11  2018    Active



 Disposable, (ADVOCATE            



 PEN NEEDLE) 31 gauge x            



 3/16" Ndle            

 

 nitroglycerin  Place 1 tablet  30 tablet  1  2019    Active



 (NITROSTAT) 0.4 mg  under the tongue          



 sublingual tablet  every  5 (five)          



   minutes as          



   needed for Chest          



   pain.          

 

 lisinopril 10 mg tablet  Take 1 tablet by  30 tablet  11  2019    Active



   mouth daily.          

 

 Insulin Lispro, Human,  inject 5-15  1 Box  11  2019    Active



 (HUMALOG U-100 INSULIN)  Units under the          



 100 unit/mL cartridge  skin before          



   meals.          

 

 ezetimibe 10 mg  Take 1 tablet by  30 tablet  3  2019    Active



 tabletIndications:  mouth daily.          



 Mixed hyperlipidemia            

 

 FLUoxetine 10 mg  Take 1 capsule  30 capsule  11  2019    Active



 capsuleIndications:  by mouth daily.          



 Major depressive            



 disorder with current            



 active episode,            



 unspecified depression            



 episode severity,            



 unspecified whether            



 recurrent            









 Hospital, Clinic,  Ordered Dose  Route  Frequency  Start Date  End Date  Status



 or Other Facility            



 Administered            



 Medication            

 

 triamcinolone  40 mg  Intra-artic  ONCE  2019  Ended



 acetonide (KENALOG)    u      9  



 injection 40 mg            

 

 triamcinolone  40 mg  Intra-artic  ONCE  2019  Discontinued



 acetonide (KENALOG)    u      9  



 injection 40 mg            



documented as of this encounter (statuses as of 2019)



Active Problems







 Problem  Noted Date

 

 Type 2 diabetes mellitus without complication, with long-term current use  



 of insulin  

 

 Essential hypertension  2019

 

 Hyperlipidemia, unspecified hyperlipidemia type  2019



documented as of this encounter (statuses as of 2019)



Immunizations







 Name  Administration Dates  Next Due

 

 Influenza Virus Vaccine Quad IM 3+ YRS  2019  



documented as of this encounter



Social History







 Tobacco Use  Types  Packs/Day  Years Used  Date

 

 Never Smoker        









 Smokeless Tobacco: Never Used      









 Alcohol Use  Drinks/Week  oz/Week  Comments

 

 No      









 Sex Assigned at Birth  Date Recorded

 

 Not on file  









 Job Start Date  Occupation  Industry

 

 Not on file  Not on file  Not on file









 Travel History  Travel Start  Travel End









 No recent travel history available.



documented as of this encounter



Last Filed Vital Signs







 Vital Sign  Reading  Time Taken  Comments

 

 Blood Pressure  138/79  2019  8:57 AM CDT  

 

 Pulse  82  2019  8:57 AM CDT  

 

 Temperature  36.8 C (98.3 F)  2019  8:57 AM CDT  

 

 Respiratory Rate  18  2019  8:57 AM CDT  

 

 Oxygen Saturation  96%  2019  8:57 AM CDT  

 

 Inhaled Oxygen Concentration  -  -  

 

 Weight  69.5 kg (153 lb 3.2 oz)  2019  8:57 AM CDT  

 

 Height  157.5 cm (5' 2")  2019  8:57 AM CDT  

 

 Body Mass Index  28.02  2019  8:57 AM CDT  



documented in this encounter



Progress Notes

Reynaldo Rooney III, MD - 2019  8:40 AM CDTFormatting of this note might be 
different from the original.

Cc:

Chief Complaint

Patient presents with

 REFERRAL

  Ortho

 Diabetes

 Shoulder Pain

  7/10

 Pelvic Pain

  right side

 Depression



Lata Lal is a 63 year old female.

Shoulder Pain

The pain is present in the right shoulder. This is a recurrent problem. The 
current episode started more than 1 year ago. There has been no history of 
extremity trauma. The problem occurs intermittently. The problem has been 
gradually worsening. The quality of the pain is described as aching. The painis 
moderate. Associated symptoms include a limited range of motion. Pertinent 
negatives include no fever. The treatment provided mild relief.



Medications

Outpatient Medications Prior to Visit

Medication Sig Dispense Refill

 Insulin Lispro, Human, (HUMALOG U-100 INSULIN) 100 unit/mL cartridge inject 
5-15 Units under theskin before meals. 1 Box 11

 lisinopril 10 mg tablet Take 1 tablet by mouth daily. 30 tablet 11

 Insulin Detemir (LEVEMIR FLEXTOUCH U-100 INSULN) 100 unit/mL (3 mL) 
injection inject 42 Units under the skin daily. 1 Box 11

 Insulin Needles, Disposable, (ADVOCATE PEN NEEDLE) 31 gauge x 3/16" Ndle 
Use as directed 1 Box 11

 ezetimibe 10 mg tablet Take 1 tablet by mouth daily. 30 tablet 3

 nitroglycerin (NITROSTAT) 0.4 mg sublingual tablet Place 1 tablet under the 
tongue every  5 (five) minutes as needed for Chest pain. 30 tablet 1



No facility-administered medications prior to visit.



Review of Systems

Constitutional: Negative for fever and unexpected weight change.

Respiratory: Negative for cough and shortness of breath.

Cardiovascular: Negative for leg swelling.

Genitourinary: Positive for pelvic pain.

Musculoskeletal: Positive for arthralgias.

Skin: Negative for rash.

Neurological: Negative for headaches.

Psychiatric/Behavioral: Positive for dysphoric mood and sleep disturbance.

Hematological: Negative for adenopathy. Does not bruise/bleed easily.



Vital Signs

/79 (BP Location: Left arm, Patient Position: Sitting, BP CUFF SIZE: 
Adult Medium)  | Pulse 82 | Temp 36.8 C (98.3 F) (Oral)  | Resp 18  | Ht 5' 
2" (1.575 m)  | Wt 153 lb 3.2 oz (69.5 kg)  |SpO2 96%  | BMI 28.02 kg/m



Physical Exam

Constitutional: She is oriented to person, place, and time. No distress.

Overweight female

HENT:

Head: Normocephalic.

Eyes: Pupils are equal, round, and reactive to light.

Neck: Normal range of motion.

Cardiovascular: Normal rate and regular rhythm.

Pulmonary/Chest: Effort normal.

Musculoskeletal: She exhibits no edema.

Very limited motion right shoulder, diffuse pain, left ok,

Neurological: She is alert and oriented to person, place, and time.

Skin: Skin is warm and dry. No rash noted.

Psychiatric:

depressed

Vitals reviewed.



Assessment/Plan

1. Major depressive disorder with current active episode, unspecified 
depression episode severity, unspecified whether recurrent



- CONSULT/REFERRAL PSYCHOLOGY

- FLUoxetine 10 mg capsule; Take 1 capsule by mouth daily.  Dispense: 30 capsule
; Refill: 11

2. Adhesive capsulitis

After verbal consent , right shoulder prepped with betadine, injected with 
posterior approach, with 40mg kenalog and 4ml of 1 % lidocaine. Patient 
tolerated well, cold compress on and off, then gentle rom exercises

Will get mr of shoulder to evaluate tearsElectronically signed by Reynaldo Rooney III, MD at 2019  9:49 AM CDTdocumented in this encounter



Plan of Treatment







 Name  Type  Priority  Associated Diagnoses  Order Schedule

 

 MR SHOULDER RIGHT WO  IMAGING  Routine  Adhesive capsulitis of  Expected: ,



 CONTRAST      right shoulder  Expires: 2020









 Health Maintenance  Due Date  Last Done  Comments

 

 PNEUMOCOCCAL 0-64 YEARS COMBINED SERIES (1  1960    



 of 1 - PPSV23)      

 

 EYE EXAM  1964    

 

 URINE MICROALBUMIN  1964    

 

 DTaP,Tdap,and Td Vaccines (1 - Tdap)  1973    

 

 PAP SMEAR  1975    

 

 COLONOSCOPY  2004    

 

 Zoster Recombinant Vaccine (SHINGRIX) (1  2004    



 of 2)      

 

 HgA1C  2019  

 

 INFLUENZA VACCINE (#1)  2019  

 

 CREATININE (SERUM)  2020  

 

 LDL-C  2020  

 

 MAMMOGRAM  01/10/2020  01/10/2019  

 

 FOOT EXAM  2020, 2019  

 

 HEPATITIS C (HCV) SCREEN  Completed  2019  



documented as of this encounter



Results

Not on filedocumented in this encounter



Visit Diagnoses







 Diagnosis

 

 Adhesive capsulitis of right shoulder - Primary







 Adhesive capsulitis of shoulder

 

 Major depressive disorder with current active episode, unspecified depression 
episode



 severity, unspecified whether recurrent



documented in this encounter



Administered Medications







 Medication Order  MAR Action  Action Date  Dose  Rate  Site

 

 triamcinolone acetonide (KENALOG)  Given  2019  9:42 AM CDT  40 mg    



 injection 40 mg



          



 40 mg, Intra-articular, ONCE, 1          



 dose, Tue 19 at 1045, Routine          









   



documented in this encounter



Insurance







 Payer  Benefit Plan / Group  Subscriber ID  Effective Dates  Phone  Address  
Type

 

 [employer]ET[employer]  [facility]  [id number]  2018-Present      HMO









 Guarantor Name  Account Type  Relation to  Date of  Phone  Billing Address



     Patient  Birth    

 

 Lata Lal  Personal/Famil  Self  1955  [phone]  [address]



   y      7 (Home)  [place]



           [address]



documented as of this encounter

## 2020-01-12 NOTE — XMS REPORT
Patient Summary Document

 Created on:2020



Patient:CON VILLASENOR

Sex:Female

:1955

External Reference #:[id number]





Demographics







 Address  [address] [place] [address]

 

 Home Phone  [phone]

 

 Work Phone  [phone]

 

 Email Address  NONE

 

 Preferred Language  Unknown

 

 Marital Status  Unknown

 

 Confucianist Affiliation  Unknown

 

 Race  Unknown

 

 Additional Race(s)  Unavailable

 

 Ethnic Group  Unknown









Author







 Organization  [facility]

 

 Address  [address] [place] [address] Dr. Rocha [address] [place] [address]

 

 Phone  [phone]









Care Team Providers







 Name  Role  Phone

 

 Unavailable  Unavailable  Unavailable









Problems

This patient has no known problems.



Allergies, Adverse Reactions, Alerts

This patient has no known allergies or adverse reactions.



Medications

This patient has no known medications.

## 2020-01-12 NOTE — XMS REPORT
Summary of Care

 Created on:2019



Patient:Lata Lal

Sex:Female

:1955

External Reference #:[id number]





Demographics







 Address  [address] [place] [address]

 

 Mobile Phone  [phone]

 

 Home Phone  [phone]

 

 Phone  [phone]

 

 Email Address  [email or web address]

 

 Preferred Language  English

 

 Marital Status  

 

 Episcopal Affiliation  Unknown

 

 Race  White

 

 Ethnic Group  Not  or 









Author







 Organization  [facility]

 

 Address  [address] [place] [address]









Support







 Name  Relationship  Address  Phone

 

 Darrian Lal  Unavailable  [address] [facility]  [phone]



     [place] [address]  









Care Team Providers







 Name  Role  Phone

 

 Reynaldo Rooney MD  Primary Care Provider  [phone]









Reason for Referral

MRI/CAT Scan (Routine)





 Status  Reason  Specialty  Diagnoses /  Referred By  Referred To



       Procedures  Contact  Contact

 

 Closed    Diagnostic  Diagnoses



Adhesive capsulitis of right shoulder  Reynaldo Rooney  



     Radiology  



Procedures



MR SHOULDER RIGHT IDALMIS DUNNE III, MD



  



         [address] [place] [address] [address]



  



         [address]



  



         [place]  



         [address]



  



         Phone:  



         [phone]



  



         Fax: [phone]  





MRI/CAT Scan (Routine)





 Status  Reason  Specialty  Diagnoses /  Referred By  Referred To



       Procedures  Contact  Contact

 

 Closed    Diagnostic  Diagnoses



Adhesive capsulitis of right shoulder  Reynaldo Rooney  



     Radiology  



Procedures



MR SHOULDER RIGHT IDALMIS DUNNE III, MD



  



         [address] [place] [address] [address]



  



         [address]



  



         [place]  



         [address]



  



         Phone:  



         [phone]



  



         Fax: [phone]  









Reason for Visit

MRI/CAT Scan (Routine)





 Status  Reason  Specialty  Diagnoses /  Referred By  Referred To



       Procedures  Contact  Contact

 

 Closed    Diagnostic  Diagnoses



Adhesive capsulitis of right shoulder  Reynaldo Rooney  



     Radiology  



Procedures



MR SHOULDER RIGHT WO VIBHA LANIER MD



  



         [address] [place] [address] [address]



  



         [address]



  



         [place]  



         [address]



  



         Phone:  



         [phone]



  



         Fax: [phone]  









Encounter Details







 Date  Type  Department  Care Team  Description

 

 2019  Hospital Encounter  [facility] Reynaldo Steiner III,  
Arrived



     [facility]



  MD



  



     [address]



  



     [place], [place]  [address]



  



     [address]



  [place] [address]



  



     [phone]  [phone]



  



       [phone] (Fax)  







Allergies







 Active Allergy  Reactions  Severity  Noted Date  Comments

 

 Morphine  Rash    2018  

 

 Tramadol  Nausea and/or Vomiting  High  2018  



documented as of this encounter (statuses as of 2019)



Medications







 Medication  Sig  Dispensed  Refills  Start Date  End Date  Status

 

 Insulin Detemir  inject 42 Units  1 Box  11  2018    Active



 (LEVEMIR FLEXTOUCH  under the skin          



 U-100 INSULN) 100  daily.          



 unit/mL (3 mL)            



 injection            

 

 Insulin Needles,  Use as directed  1 Box  11  2018    Active



 Disposable, (ADVOCATE            



 PEN NEEDLE) 31 gauge x            



 3/16" Ndle            

 

 nitroglycerin  Place 1 tablet  30 tablet  1  2019    Active



 (NITROSTAT) 0.4 mg  under the tongue          



 sublingual tablet  every  5 (five)          



   minutes as          



   needed for Chest          



   pain.          

 

 lisinopril 10 mg tablet  Take 1 tablet by  30 tablet  11  2019    Active



   mouth daily.          

 

 Insulin Lispro, Human,  inject 5-15  1 Box  11  2019    Active



 (HUMALOG U-100 INSULIN)  Units under the          



 100 unit/mL cartridge  skin before          



   meals.          

 

 ezetimibe 10 mg  Take 1 tablet by  30 tablet  3  2019    Active



 tabletIndications:  mouth daily.          



 Mixed hyperlipidemia            

 

 FLUoxetine 10 mg  Take 1 capsule  30 capsule  11  2019    Active



 capsuleIndications:  by mouth daily.          



 Major depressive            



 disorder with current            



 active episode,            



 unspecified depression            



 episode severity,            



 unspecified whether            



 recurrent            



documented as of this encounter (statuses as of 2019)



Active Problems







 Problem  Noted Date

 

 Type 2 diabetes mellitus without complication, with long-term current use  



 of insulin  

 

 Essential hypertension  2019

 

 Hyperlipidemia, unspecified hyperlipidemia type  2019



documented as of this encounter (statuses as of 2019)



Immunizations







 Name  Administration Dates  Next Due

 

 Influenza Virus Vaccine Quad IM 3+ YRS  2019  



documented as of this encounter



Social History







 Tobacco Use  Types  Packs/Day  Years Used  Date

 

 Never Smoker        









 Smokeless Tobacco: Never Used      









 Alcohol Use  Drinks/Week  oz/Week  Comments

 

 No      









 Sex Assigned at Birth  Date Recorded

 

 Not on file  









 Job Start Date  Occupation  Industry

 

 Not on file  Not on file  Not on file









 Travel History  Travel Start  Travel End









 No recent travel history available.



documented as of this encounter



Last Filed Vital Signs

Not on filedocumented in this encounter



Plan of Treatment







 Health Maintenance  Due Date  Last Done  Comments

 

 PNEUMOCOCCAL 0-64 YEARS COMBINED SERIES (1  1960    



 of 1 - PPSV23)      

 

 EYE EXAM  1964    

 

 URINE MICROALBUMIN  1964    

 

 DTaP,Tdap,and Td Vaccines (1 - Tdap)  1973    

 

 PAP SMEAR  1975    

 

 COLONOSCOPY  2004    

 

 Zoster Recombinant Vaccine (SHINGRIX) (1  2004    



 of 2)      

 

 HgA1C  2019  

 

 INFLUENZA VACCINE (#1)  2019  

 

 CREATININE (SERUM)  2020  

 

 LDL-C  2020  

 

 MAMMOGRAM  01/10/2020  01/10/2019  

 

 FOOT EXAM  2020, 2019  

 

 HEPATITIS C (HCV) SCREEN  Completed  2019  



documented as of this encounter



Procedures







 Procedure Name  Priority  Date/Time  Associated Diagnosis  Comments

 

 MR SHOULDER RIGHT  Routine  2019  9:46 AM  Adhesive capsulitis  Results 
for this



 WO CONTRAST    CDT  of right shoulder  procedure are in



         the results



         section.



documented in this encounter



Results

MR SHOULDER RIGHT WO CONTRAST (2019  9:46 AM CDT)





 Specimen

 

 









 Impressions  Performed At

 

  



  PACS/VR/DOSE



 Rotator cuff tendon tears involving the supraspinatus, infraspinatus and



  



 subscapularis on a background of tendinosis as above-described.



  



  



  



 Extra-articular bicipital tenosynovitis with mild to moderate



  



 intra-articular tendinosis.



  



  



  



 Moderate to severe acromioclavicular joint osteoarthrosis.



  



  



  



 Degenerative signal of the superior labrum.



  



  



  



 Isolated fatty atrophy of the teres minor can be seen with quadrilateral



  



 space syndrome.



  



  



  



 Jl TYLER MD., have reviewed this study and agree with the  



 above



  



 report.  









 Narrative  Performed At

 

 * * * * * * * * ORIGINAL REPORT * * * * * * * *



  PACS/VR/DOSE



 EXAM:



  



  



  



 MRI RIGHT SHOULDER



  



  



  



 COMPARISON:



  



  



  



 None.



  



  



  



 HISTORY: 



  



  



  



 Shoulder pain, rotator cuff tear/impingement suspected 



  



  



  



 TECHNIQUE AND FINDINGS:



  



  



  



 1.5T multiplanar multiweighted MR imaging of the right shoulder was



  



 performed without IV contrast.



  



  



  



 BONE AND JOINT:



  



 Acromion morphology is type 1. A small subacromial enthesophyte is  



 present



  



 and can predispose to external impingement.



  



  



  



 The acromioclavicular joint demonstrates osteophytosis, capsular



  



 hypertrophy, and subcortical cystic change, and small joint effusion,  



 and



  



 marrow edema of the subarticular distal clavicle and the acromion.



  



  



  



 Trace volume glenohumeral joint effusion is present. Thinning of the



  



 glenohumeral cartilage is seen. No full-thickness or high-grade chondral



  



 loss is identified. No focal marrow signal intensity abnormality is



  



 present. Enthesophytosis of the greater tuberosity is noted. A  



 degenerative



  



 cyst is noted at the lesser tuberosity.



  



  



  



 Intermediate signal is seen in the superior labrum at the 12:00 to 10:00



  



 position. No labral detachment is identified.



  



  



  



 LIGAMENTS AND TENDONS:



  



  



  



 Biceps tendon:A small amount of fluid is in the biceps tendon  



 sheath.



  



 There is thickening and increased signal of the intra-articular biceps



  



 tendon.



  



  



  



 Supraspinatus tendon: A bursal surface partial-thickness tear is noted  



 at



  



 the humeral insertion with a full thickness perforation extending to the



  



 articular surface. No tendon retraction is noted. A background of  



 moderate



  



 tendinosis is identified, manifested by thickening and increased signal  



 of



  



 the tendon.



  



  



  



 Infraspinatus tendon: Partial-thickness interstitial tear is seen at the



  



 critical zone, extending to the musculotendinous junction. A background  



 of



  



 tendinosis is also identified.



  



  



  



 Subscapularis tendon: The tendon is thickened with intermediate signal,



  



 consistent with moderate tendinosis. Articular surface fraying is



  



 identified. In addition, a subacute partial-thickness interstitial tear  



 is



  



 better seen in the sagittal sequences. Medialization of the biceps  



 tendon



  



 is identified, worrisome for biceps pully mechanism injury.



  



  



  



 Teres minor tendon: Intact.



  



  



  



 SOFT TISSUES:



  



  



  



 Isolated fatty atrophy of the teres minor is identified.



  



  



  



 No muscle edema is present.



  



  



  



 Trace fluid is present in the subacromial subdeltoid bursae.



  



  



  



 Small volume fluid distends the subcoracoid bursa.



  



   









 Procedure Note

 

 [facility], Radiant Results Inft User - 2019  3:58 PM CDT



* * * * * * * * ORIGINAL REPORT * * * * * * * *



 EXAM:



 



 MRI RIGHT SHOULDER



 



 COMPARISON:



 



 None.



 



 HISTORY:



 



 Shoulder pain, rotator cuff tear/impingement suspected



 



 TECHNIQUE AND FINDINGS:



 



 1.5T multiplanar multiweighted MR imaging of the right shoulder was



 performed without IV contrast.



 



 BONE AND JOINT:



 Acromion morphology is type 1. A small subacromial enthesophyte is present



 and can predispose to external impingement.



 



 The acromioclavicular joint demonstrates osteophytosis, capsular



 hypertrophy, and subcortical cystic change, and small joint effusion, and



 marrow edema of the subarticular distal clavicle and the acromion.



 



 Trace volume glenohumeral joint effusion is present. Thinning of the



 glenohumeral cartilage is seen. No full-thickness or high-grade chondral



 loss is identified. No focal marrow signal intensity abnormality is



 present. Enthesophytosis of the greater tuberosity is noted. A degenerative



 cyst is noted at the lesser tuberosity.



 



 Intermediate signal is seen in the superior labrum at the 12:00 to 10:00



 position. No labral detachment is identified.



 



 LIGAMENTS AND TENDONS:



 



 Biceps tendon:  A small amount of fluid is in the biceps tendon sheath.



 There is thickening and increased signal of the intra-articular biceps



 tendon.



 



 Supraspinatus tendon: A bursal surface partial-thickness tear is noted at



 the humeral insertion with a full thickness perforation extending to the



 articular surface. No tendon retraction is noted. A background of moderate



 tendinosis is identified, manifested by thickening and increased signal of



 the tendon.



 



 Infraspinatus tendon: Partial-thickness interstitial tear is seen at the



 critical zone, extending to the musculotendinous junction. A background of



 tendinosis is also identified.



 



 Subscapularis tendon: The tendon is thickened with intermediate signal,



 consistent with moderate tendinosis. Articular surface fraying is



 identified. In addition, a subacute partial-thickness interstitial tear is



 better seen in the sagittal sequences. Medialization of the biceps tendon



 is identified, worrisome for biceps pully mechanism injury.



 



 Teres minor tendon: Intact.



 



 SOFT TISSUES:



 



 Isolated fatty atrophy of the teres minor is identified.



 



 No muscle edema is present.



 



 Trace fluid is present in the subacromial subdeltoid bursae.



 



 Small volume fluid distends the subcoracoid bursa.



 



 IMPRESSION



 



 Rotator cuff tendon tears involving the supraspinatus, infraspinatus and



 subscapularis on a background of tendinosis as above-described.



 



 Extra-articular bicipital tenosynovitis with mild to moderate



 intra-articular tendinosis.



 



 Moderate to severe acromioclavicular joint osteoarthrosis.



 



 Degenerative signal of the superior labrum.



 



 Isolated fatty atrophy of the teres minor can be seen with quadrilateral



 space syndrome.



 



 IRafal MD., have reviewed this study and agree with the above



 report.









 Performing Organization  Address  City/State/Zipcode  Phone Number

 

 PACS/VR/DOSE      



documented in this encounter



Visit Diagnoses







 Diagnosis

 

 Adhesive capsulitis of right shoulder







 Adhesive capsulitis of shoulder



documented in this encounter



Insurance







 Payer  Benefit Plan / Group  Subscriber ID  Effective Dates  Phone  Address  
Type

 

 MENDEL OGLESBY [facility]  [id number]  2018-Present      HMO









 Guarantor Name  Account Type  Relation to  Date of  Phone  Billing Address



     Patient  Birth    

 

 aLta Lal  Personal/Famil  Self  1955  [phone]  [address]



   y      [address] (Home)  [place]



           [address]



documented as of this encounter

## 2020-01-12 NOTE — XMS REPORT
Summary of Care

 Created on:2019



Patient:Lata Lal

Sex:Female

:1955

External Reference #:[id number]





Demographics







 Address  [address] [place] [address]

 

 Mobile Phone  [phone]

 

 Home Phone  [phone]

 

 Phone  [phone]

 

 Email Address  [email or web address]

 

 Preferred Language  English

 

 Marital Status  

 

 Spiritism Affiliation  Unknown

 

 Race  White

 

 Ethnic Group  Not  or 









Author







 Organization  [facility]

 

 Address  [address] [place] [id number]









Support







 Name  Relationship  Address  Phone

 

 Darrian Lal  Unavailable  [address] [facility]  [phone]



     [place] [address]  









Care Team Providers







 Name  Role  Phone

 

 Reynaldo Rooney MD  Primary Care Provider  [phone]









Reason for Referral

 (Routine)





 Status  Reason  Specialty  Diagnoses /  Referred By  Referred To



       Procedures  Contact  Contact

 

 New Request    Orthopedic Surgery  Diagnoses



Adhesive capsulitis of right shoulder  Reynaldo Rooney  



       



Procedures



CONSULT/REFERRAL ORTHOPAEDIC SURGERY  MD YANNICK



  



         [address] [place] [address]  



         Dr. Causey [address] [place] [address]



  



         Phone:  



         [phone]



  



         Fax:  



         [phone]  





MRI/CAT Scan (Routine)





 Status  Reason  Specialty  Diagnoses /  Referred By  Referred To



       Procedures  Contact  Contact

 

 Closed    Diagnostic  Diagnoses



Adhesive capsulitis of right shoulder  Reynaldo Rooney  



     Radiology  



Procedures



MR SHOULDER RIGHT WO CONTRAST  MD YANNCIK



  



         [address] [place] [address] Dr. Causey [address] [place] [address]



  



         Phone:  



         [phone]



  



         Fax: [phone]  





 (Routine)





 Status  Reason  Specialty  Diagnoses /  Referred By  Referred To



       Procedures  Contact  Contact

 

 New Request  Location  Psychiatry  Diagnoses



Major depressive disorder with current active episode, unspecified depression 
episode severity, unspecified whether recurrent  Reynaldo Rooney  



   Preference    



Procedures



CONSULT/REFERRAL PSYCHOLOGY  MD YANNICK



  



         [address] [place] [address]  



         Dr. Causey [address] [place] [address]



  



         Phone:  



         [phone]



  



         Fax:  



         [phone]  









Reason for Visit







 Reason  Comments

 

 REFERRAL  Ortho

 

 Diabetes  

 

 Shoulder Pain  7/10

 

 Pelvic Pain  right side

 

 Depression  







Encounter Details







 Date  Type  Department  Care Team  Description

 

 2019  Office Visit  [facility]  Reynaldo Rooney III,  Adhesive 
capsulitis of right shoulder (Primary Dx);



     and Adult Primary  MD



  Major depressive disorder with current active episode, unspecified depression 
episode severity, unspecified whether recurrent



     Care- [address] [facility] ,  [address]



  



     [address]



  [place] [address]



  



     [place]  [phone] [address] [phone] (Fax)  



     [phone]    







Allergies







 Active Allergy  Reactions  Severity  Noted Date  Comments

 

 Morphine  Rash    2018  

 

 Tramadol  Nausea and/or Vomiting  High  2018  



documented as of this encounter (statuses as of 2019)



Medications







 Medication  Sig  Dispensed  Refills  Start Date  End Date  Status

 

 Insulin Detemir  inject 42 Units  1 Box  11  2018    Active



 (LEVEMIR FLEXTOUCH  under the skin          



 U-100 INSULN) 100  daily.          



 unit/mL (3 mL)            



 injection            

 

 Insulin Needles,  Use as directed  1 Box  11  2018    Active



 Disposable, (ADVOCATE            



 PEN NEEDLE) 31 gauge x            



 3/16" Ndle            

 

 nitroglycerin  Place 1 tablet  30 tablet  1  2019    Active



 (NITROSTAT) 0.4 mg  under the tongue          



 sublingual tablet  every  5 (five)          



   minutes as          



   needed for Chest          



   pain.          

 

 lisinopril 10 mg tablet  Take 1 tablet by  30 tablet  11  2019    Active



   mouth daily.          

 

 Insulin Lispro, Human,  inject 5-15  1 Box  11  2019    Active



 (HUMALOG U-100 INSULIN)  Units under the          



 100 unit/mL cartridge  skin before          



   meals.          

 

 ezetimibe 10 mg  Take 1 tablet by  30 tablet  3  2019    Active



 tabletIndications:  mouth daily.          



 Mixed hyperlipidemia            

 

 FLUoxetine 10 mg  Take 1 capsule  30 capsule  11  2019    Active



 capsuleIndications:  by mouth daily.          



 Major depressive            



 disorder with current            



 active episode,            



 unspecified depression            



 episode severity,            



 unspecified whether            



 recurrent            









 Hospital, Clinic,  Ordered Dose  Route  Frequency  Start Date  End Date  Status



 or Other Facility            



 Administered            



 Medication            

 

 triamcinolone  40 mg  Intra-artic  ONCE  2019  Ended



 acetonide (KENALOG)    u      9  



 injection 40 mg            

 

 triamcinolone  40 mg  Intra-artic  ONCE  2019  Discontinued



 acetonide (KENALOG)    u      9  



 injection 40 mg            



documented as of this encounter (statuses as of 2019)



Active Problems







 Problem  Noted Date

 

 Type 2 diabetes mellitus without complication, with long-term current use  



 of insulin  

 

 Essential hypertension  2019

 

 Hyperlipidemia, unspecified hyperlipidemia type  2019



documented as of this encounter (statuses as of 2019)



Immunizations







 Name  Administration Dates  Next Due

 

 Influenza Virus Vaccine Quad IM 3+ YRS  2019  



documented as of this encounter



Social History







 Tobacco Use  Types  Packs/Day  Years Used  Date

 

 Never Smoker        









 Smokeless Tobacco: Never Used      









 Alcohol Use  Drinks/Week  oz/Week  Comments

 

 No      









 Sex Assigned at Birth  Date Recorded

 

 Not on file  









 Job Start Date  Occupation  Industry

 

 Not on file  Not on file  Not on file









 Travel History  Travel Start  Travel End









 No recent travel history available.



documented as of this encounter



Last Filed Vital Signs







 Vital Sign  Reading  Time Taken  Comments

 

 Blood Pressure  138/79  2019  8:57 AM CDT  

 

 Pulse  82  2019  8:57 AM CDT  

 

 Temperature  36.8 C (98.3 F)  2019  8:57 AM CDT  

 

 Respiratory Rate  18  2019  8:57 AM CDT  

 

 Oxygen Saturation  96%  2019  8:57 AM CDT  

 

 Inhaled Oxygen Concentration  -  -  

 

 Weight  69.5 kg (153 lb 3.2 oz)  2019  8:57 AM CDT  

 

 Height  157.5 cm (5' 2")  2019  8:57 AM CDT  

 

 Body Mass Index  28.02  2019  8:57 AM CDT  



documented in this encounter



Progress Notes

Reynaldo Rooney III, MD - 2019  8:40 AM CDTFormatting of this note might be 
different from the original.

Cc:

Chief Complaint

Patient presents with

 REFERRAL

  Ortho

 Diabetes

 Shoulder Pain

  7/10

 Pelvic Pain

  right side

 Depression



Lata Lal is a 63 year old female.

Shoulder Pain

The pain is present in the right shoulder. This is a recurrent problem. The 
current episode started more than 1 year ago. There has been no history of 
extremity trauma. The problem occurs intermittently. The problem has been 
gradually worsening. The quality of the pain is described as aching. The painis 
moderate. Associated symptoms include a limited range of motion. Pertinent 
negatives include no fever. The treatment provided mild relief.



Medications

Outpatient Medications Prior to Visit

Medication Sig Dispense Refill

 Insulin Lispro, Human, (HUMALOG U-100 INSULIN) 100 unit/mL cartridge inject 
5-15 Units under theskin before meals. 1 Box 11

 lisinopril 10 mg tablet Take 1 tablet by mouth daily. 30 tablet 11

 Insulin Detemir (LEVEMIR FLEXTOUCH U-100 INSULN) 100 unit/mL (3 mL) 
injection inject 42 Units under the skin daily. 1 Box 11

 Insulin Needles, Disposable, (ADVOCATE PEN NEEDLE) 31 gauge x 3/16" Ndle 
Use as directed 1 Box 11

 ezetimibe 10 mg tablet Take 1 tablet by mouth daily. 30 tablet 3

 nitroglycerin (NITROSTAT) 0.4 mg sublingual tablet Place 1 tablet under the 
tongue every  5 (five) minutes as needed for Chest pain. 30 tablet 1



No facility-administered medications prior to visit.



Review of Systems

Constitutional: Negative for fever and unexpected weight change.

Respiratory: Negative for cough and shortness of breath.

Cardiovascular: Negative for leg swelling.

Genitourinary: Positive for pelvic pain.

Musculoskeletal: Positive for arthralgias.

Skin: Negative for rash.

Neurological: Negative for headaches.

Psychiatric/Behavioral: Positive for dysphoric mood and sleep disturbance.

Hematological: Negative for adenopathy. Does not bruise/bleed easily.



Vital Signs

/79 (BP Location: Left arm, Patient Position: Sitting, BP CUFF SIZE: 
Adult Medium)  | Pulse 82 | Temp 36.8 C (98.3 F) (Oral)  | Resp 18  | Ht 5' 
2" (1.575 m)  | Wt 153 lb 3.2 oz (69.5 kg)  |SpO2 96%  | BMI 28.02 kg/m



Physical Exam

Constitutional: She is oriented to person, place, and time. No distress.

Overweight female

HENT:

Head: Normocephalic.

Eyes: Pupils are equal, round, and reactive to light.

Neck: Normal range of motion.

Cardiovascular: Normal rate and regular rhythm.

Pulmonary/Chest: Effort normal.

Musculoskeletal: She exhibits no edema.

Very limited motion right shoulder, diffuse pain, left ok,

Neurological: She is alert and oriented to person, place, and time.

Skin: Skin is warm and dry. No rash noted.

Psychiatric:

depressed

Vitals reviewed.



Assessment/Plan

1. Major depressive disorder with current active episode, unspecified 
depression episode severity, unspecified whether recurrent



- CONSULT/REFERRAL PSYCHOLOGY

- FLUoxetine 10 mg capsule; Take 1 capsule by mouth daily.  Dispense: 30 capsule
; Refill: 11

2. Adhesive capsulitis

After verbal consent , right shoulder prepped with betadine, injected with 
posterior approach, with 40mg kenalog and 4ml of 1 % lidocaine. Patient 
tolerated well, cold compress on and off, then gentle rom exercises

Will get mr of shoulder to evaluate tearsElectronically signed by Reynaldo Rooney III, MD at 2019  9:49 AM CDTdocumented in this encounter



Plan of Treatment







 Health Maintenance  Due Date  Last Done  Comments

 

 PNEUMOCOCCAL 0-64 YEARS COMBINED SERIES (1  1960    



 of 1 - PPSV23)      

 

 EYE EXAM  1964    

 

 URINE MICROALBUMIN  1964    

 

 DTaP,Tdap,and Td Vaccines (1 - Tdap)  1973    

 

 PAP SMEAR  1975    

 

 COLONOSCOPY  2004    

 

 Zoster Recombinant Vaccine (SHINGRIX) (1  2004    



 of 2)      

 

 HgA1C  2019  

 

 INFLUENZA VACCINE (#1)  2019  

 

 CREATININE (SERUM)  2020  

 

 LDL-C  2020  

 

 MAMMOGRAM  01/10/2020  01/10/2019  

 

 FOOT EXAM  2020, 2019  

 

 HEPATITIS C (HCV) SCREEN  Completed  2019  



documented as of this encounter



Results

MR SHOULDER RIGHT WO CONTRAST (2019  9:46 AM CDT)





 Specimen

 

 









 Impressions  Performed At

 

  



  PACS/VR/DOSE



 Rotator cuff tendon tears involving the supraspinatus, infraspinatus and



  



 subscapularis on a background of tendinosis as above-described.



  



  



  



 Extra-articular bicipital tenosynovitis with mild to moderate



  



 intra-articular tendinosis.



  



  



  



 Moderate to severe acromioclavicular joint osteoarthrosis.



  



  



  



 Degenerative signal of the superior labrum.



  



  



  



 Isolated fatty atrophy of the teres minor can be seen with quadrilateral



  



 space syndrome.



  



  



  



 Jl TYLER MD., have reviewed this study and agree with the  



 above



  



 report.  









 Narrative  Performed At

 

 * * * * * * * * ORIGINAL REPORT * * * * * * * *



  PACS/VR/DOSE



 EXAM:



  



  



  



 MRI RIGHT SHOULDER



  



  



  



 COMPARISON:



  



  



  



 None.



  



  



  



 HISTORY: 



  



  



  



 Shoulder pain, rotator cuff tear/impingement suspected 



  



  



  



 TECHNIQUE AND FINDINGS:



  



  



  



 1.5T multiplanar multiweighted MR imaging of the right shoulder was



  



 performed without IV contrast.



  



  



  



 BONE AND JOINT:



  



 Acromion morphology is type 1. A small subacromial enthesophyte is  



 present



  



 and can predispose to external impingement.



  



  



  



 The acromioclavicular joint demonstrates osteophytosis, capsular



  



 hypertrophy, and subcortical cystic change, and small joint effusion,  



 and



  



 marrow edema of the subarticular distal clavicle and the acromion.



  



  



  



 Trace volume glenohumeral joint effusion is present. Thinning of the



  



 glenohumeral cartilage is seen. No full-thickness or high-grade chondral



  



 loss is identified. No focal marrow signal intensity abnormality is



  



 present. Enthesophytosis of the greater tuberosity is noted. A  



 degenerative



  



 cyst is noted at the lesser tuberosity.



  



  



  



 Intermediate signal is seen in the superior labrum at the 12:00 to 10:00



  



 position. No labral detachment is identified.



  



  



  



 LIGAMENTS AND TENDONS:



  



  



  



 Biceps tendon:A small amount of fluid is in the biceps tendon  



 sheath.



  



 There is thickening and increased signal of the intra-articular biceps



  



 tendon.



  



  



  



 Supraspinatus tendon: A bursal surface partial-thickness tear is noted  



 at



  



 the humeral insertion with a full thickness perforation extending to the



  



 articular surface. No tendon retraction is noted. A background of  



 moderate



  



 tendinosis is identified, manifested by thickening and increased signal  



 of



  



 the tendon.



  



  



  



 Infraspinatus tendon: Partial-thickness interstitial tear is seen at the



  



 critical zone, extending to the musculotendinous junction. A background  



 of



  



 tendinosis is also identified.



  



  



  



 Subscapularis tendon: The tendon is thickened with intermediate signal,



  



 consistent with moderate tendinosis. Articular surface fraying is



  



 identified. In addition, a subacute partial-thickness interstitial tear  



 is



  



 better seen in the sagittal sequences. Medialization of the biceps  



 tendon



  



 is identified, worrisome for biceps pully mechanism injury.



  



  



  



 Teres minor tendon: Intact.



  



  



  



 SOFT TISSUES:



  



  



  



 Isolated fatty atrophy of the teres minor is identified.



  



  



  



 No muscle edema is present.



  



  



  



 Trace fluid is present in the subacromial subdeltoid bursae.



  



  



  



 Small volume fluid distends the subcoracoid bursa.



  



   









 Procedure Note

 

 [facility], Radiant Results Inft User - 2019  3:58 PM CDT



* * * * * * * * ORIGINAL REPORT * * * * * * * *



 EXAM:



 



 MRI RIGHT SHOULDER



 



 COMPARISON:



 



 None.



 



 HISTORY:



 



 Shoulder pain, rotator cuff tear/impingement suspected



 



 TECHNIQUE AND FINDINGS:



 



 1.5T multiplanar multiweighted MR imaging of the right shoulder was



 performed without IV contrast.



 



 BONE AND JOINT:



 Acromion morphology is type 1. A small subacromial enthesophyte is present



 and can predispose to external impingement.



 



 The acromioclavicular joint demonstrates osteophytosis, capsular



 hypertrophy, and subcortical cystic change, and small joint effusion, and



 marrow edema of the subarticular distal clavicle and the acromion.



 



 Trace volume glenohumeral joint effusion is present. Thinning of the



 glenohumeral cartilage is seen. No full-thickness or high-grade chondral



 loss is identified. No focal marrow signal intensity abnormality is



 present. Enthesophytosis of the greater tuberosity is noted. A degenerative



 cyst is noted at the lesser tuberosity.



 



 Intermediate signal is seen in the superior labrum at the 12:00 to 10:00



 position. No labral detachment is identified.



 



 LIGAMENTS AND TENDONS:



 



 Biceps tendon:  A small amount of fluid is in the biceps tendon sheath.



 There is thickening and increased signal of the intra-articular biceps



 tendon.



 



 Supraspinatus tendon: A bursal surface partial-thickness tear is noted at



 the humeral insertion with a full thickness perforation extending to the



 articular surface. No tendon retraction is noted. A background of moderate



 tendinosis is identified, manifested by thickening and increased signal of



 the tendon.



 



 Infraspinatus tendon: Partial-thickness interstitial tear is seen at the



 critical zone, extending to the musculotendinous junction. A background of



 tendinosis is also identified.



 



 Subscapularis tendon: The tendon is thickened with intermediate signal,



 consistent with moderate tendinosis. Articular surface fraying is



 identified. In addition, a subacute partial-thickness interstitial tear is



 better seen in the sagittal sequences. Medialization of the biceps tendon



 is identified, worrisome for biceps pully mechanism injury.



 



 Teres minor tendon: Intact.



 



 SOFT TISSUES:



 



 Isolated fatty atrophy of the teres minor is identified.



 



 No muscle edema is present.



 



 Trace fluid is present in the subacromial subdeltoid bursae.



 



 Small volume fluid distends the subcoracoid bursa.



 



 IMPRESSION



 



 Rotator cuff tendon tears involving the supraspinatus, infraspinatus and



 subscapularis on a background of tendinosis as above-described.



 



 Extra-articular bicipital tenosynovitis with mild to moderate



 intra-articular tendinosis.



 



 Moderate to severe acromioclavicular joint osteoarthrosis.



 



 Degenerative signal of the superior labrum.



 



 Isolated fatty atrophy of the teres minor can be seen with quadrilateral



 space syndrome.



 



 I, Rafal Shah MD., have reviewed this study and agree with the above



 report.









 Performing Organization  Address  City/State/Zipcode  Phone Number

 

 PACS/VR/DOSE      



documented in this encounter



Visit Diagnoses







 Diagnosis

 

 Adhesive capsulitis of right shoulder - Primary







 Adhesive capsulitis of shoulder

 

 Major depressive disorder with current active episode, unspecified depression 
episode



 severity, unspecified whether recurrent



documented in this encounter



Administered Medications







 Medication Order  MAR Action  Action Date  Dose  Rate  Site

 

 triamcinolone acetonide (KENALOG)  Given  2019  9:42 AM CDT  40 mg    



 injection 40 mg



          



 40 mg, Intra-articular, ONCE, 1          



 dose, Tue 19 at 1045, Routine          









   



documented in this encounter



Insurance







 Payer  Benefit Plan / Group  Subscriber ID  Effective Dates  Phone  Address  
Type

 

 [employer]  [facility]  [id number]  2018-Present      O









 Guarantor Name  Account Type  Relation to  Date of  Phone  Billing Address



     Patient  Birth    

 

 Lata Lal  Personal/Famil  Self  1955  [phone]  [address] [facility]



   y      [address] (Home)  [place]



           [id number]



documented as of this encounter

## 2020-01-12 NOTE — EDPHYS
Physician Documentation                                                                           

 [facility]                                                                 

Name: Lata Lal                                                                             

Age: 64 yrs                                                                                       

Sex: Female                                                                                       

: 1955                                                                                   

MRN: [id number]                                                                                   

Arrival Date: 2020                                                                          

Time: 19:43                                                                                       

Account#: [id number]                                                                            

Bed 14                                                                                            

Private MD:                                                                                       

ED Physician Alena Hernandez                                                                    

HPI:                                                                                              

                                                                                             

20:26 This 64 yrs old  Female presents to ER via Ambulatory with complaints of       ma2 

      Headache.                                                                                   

20:26 The patient complains of pain to the forehead. Onset: The symptoms/episode              ma2 

      began/occurred gradually, 1 week(s) ago. Associated signs and symptoms: Pertinent           

      negatives: fever, paresthesias, sinus congestion. Headache History: The patient has had     

      previous headaches and this one is similar to previous episodes. The patient has not        

      experienced similar symptoms in the past.                                                   

                                                                                                  

Historical:                                                                                       

- Allergies:                                                                                      

19:51 Morphine;                                                                               aj1 

19:51 Tramadol HCl;                                                                           aj1 

- Home Meds:                                                                                      

19:51 Lisinopril Oral [Active]; insulin [Active];                                             aj1 

- PMHx:                                                                                           

19:51 Diabetes; Hypertension;                                                                 aj1 

                                                                                                  

- Immunization history:: Flu vaccine is not up to date.                                           

- Social history:: Smoking status: Patient/guardian denies using tobacco.                         

- Ebola Screening: : Patient denies travel to an Ebola-affected area in the 21 days               

  before illness onset.                                                                           

- Family history:: not pertinent.                                                                 

                                                                                                  

                                                                                                  

ROS:                                                                                              

20:26 Constitutional: Negative for fever, chills, and weight loss.                            ma2 

20:26 All other systems are negative.                                                             

                                                                                                  

Exam:                                                                                             

20:26 Constitutional:  This is a well developed, well nourished patient who is awake, alert,  ma2 

      and in no acute distress. Head/Face:  Normocephalic, atraumatic. Eyes:  Pupils equal        

      round and reactive to light, extra-ocular motions intact.  Lids and lashes normal.          

      Conjunctiva and sclera are non-icteric and not injected.  Cornea within normal limits.      

      Periorbital areas with no swelling, redness, or edema. ENT:  Nares patent. No nasal         

      discharge, no septal abnormalities noted.  Tympanic membranes are normal and external       

      auditory canals are clear.  Oropharynx with no redness, swelling, or masses, exudates,      

      or evidence of obstruction, uvula midline.  Mucous membranes moist. Neck:  Trachea          

      midline, no thyromegaly or masses palpated, and no cervical lymphadenopathy.  Supple,       

      full range of motion without nuchal rigidity, or vertebral point tenderness.  No            

      Meningismus. Chest/axilla:  Normal chest wall appearance and motion.  Nontender with no     

      deformity.  No lesions are appreciated. Cardiovascular:  Regular rate and rhythm with a     

      normal S1 and S2.  No gallops, murmurs, or rubs.  Normal PMI, no JVD.  No pulse             

      deficits. Respiratory:  Lungs have equal breath sounds bilaterally, clear to                

      auscultation and percussion.  No rales, rhonchi or wheezes noted.  No increased work of     

      breathing, no retractions or nasal flaring. Abdomen/GI:  Soft, non-tender, with normal      

      bowel sounds.  No distension or tympany.  No guarding or rebound.  No evidence of           

      tenderness throughout. Skin:  Warm, dry with normal turgor.  Normal color with no           

      rashes, no lesions, and no evidence of cellulitis. MS/ Extremity:  Pulses equal, no         

      cyanosis.  Neurovascular intact.  Full, normal range of motion. Neuro:  Awake and           

      alert, GCS 15, oriented to person, place, time, and situation.  Cranial nerves II-XII       

      grossly intact.  Motor strength 5/5 in all extremities.  Sensory grossly intact.            

      Cerebellar exam normal.  Normal gait.                                                       

                                                                                                  

Vital Signs:                                                                                      

19:51  / 79; Pulse 76; Resp 18; Temp 97.3; Pulse Ox 98% on R/A; Weight 68.04 kg (R);    aj1 

      Height 5 ft. 2 in. (157.48 cm) (R); Pain 9/10;                                              

21:00  / 78; Pulse 79; Resp 18; Pulse Ox 99% on R/A; Pain 9/10;                         aa1 

22:00  / 71; Pulse 82; Resp 18; Pulse Ox 99% on R/A;                                    aa1 

23:28  / 77; Pulse 87; Resp 16; Temp 97.2; Pulse Ox 100% on R/A; Pain 0/10;             aa1 

19:51 Body Mass Index 27.44 (68.04 kg, 157.48 cm)                                             aj1 

                                                                                                  

Beatriz Coma Score:                                                                               

20:26 Eye Response: spontaneous(4). Verbal Response: oriented(5). Motor Response: obeys       ma2 

      commands(6). Total: 15.                                                                     

                                                                                                  

MDM:                                                                                              

20:12 Patient medically screened.                                                             [facility] 

20:26 Differential diagnosis: intracerebral hemorrhage, tension headache, traumatic injuries, ma2 

      uremia.                                                                                     

23:03 Data reviewed: vital signs, nurses notes. Counseling: I had a detailed discussion with  [facility] 

      the patient and/or guardian regarding: the historical points, exam findings, and any        

      diagnostic results supporting the discharge/admit diagnosis, the presence of at least       

      one elevated blood pressure reading (>120/80) during this emergency department visit,       

      the need for outpatient follow up. Response to treatment: the patient's symptoms have       

      resolved after treatment. ED course: she did not want to give urine, feels better want      

      to go home .                                                                                

                                                                                                  

                                                                                             

20:06 Order name: Glucose, Ancillary Testing; Complete Time: 20:28                            [facility]

                                                                                             

20:30 Order name: Basic Metabolic Panel                                                       [facility] 

                                                                                             

20:30 Order name: CBC with Diff                                                               [facility] 

                                                                                             

20:30 Order name: Creatinine for Radiology                                                    [facility] 

                                                                                             

20:30 Order name: Hepatic Function                                                            [facility] 

                                                                                             

20:30 Order name: Lipase; Complete Time: 22:14                                                [facility] 

                                                                                             

20:30 Order name: CT Head Brain wo Cont                                                       [facility] 

                                                                                             

20:30 Order name: Chest Single View XRAY                                                      [facility] 

                                                                                             

20:30 Order name: Basic Metabolic Panel; Complete Time: 22:14                                 [facility]

                                                                                             

20:30 Order name: CBC with Automated Diff; Complete Time: 22:14                               [facility]

                                                                                             

20:30 Order name: Creatinine (Radiology Only); Complete Time: 22:14                           [facility]

                                                                                             

20:30 Order name: Liver (Hepatic) Function; Complete Time: 22:14                              [facility]

                                                                                             

20:30 Order name: IV Saline Lock; Complete Time: 21:41                                        [facility] 

                                                                                             

20:30 Order name: Labs collected and sent; Complete Time: 21:41                               [facility] 

                                                                                                  

Administered Medications:                                                                         

21:35 Drug: NS 0.9% 1000 ml Route: IV; Rate: 1 bolus; Site: right antecubital;                wh  

22:36 Follow up: IV Status: Completed infusion; IV Intake: 1000ml                             aa1 

21:37 Drug: TORadol 30 mg Route: IVP; Site: right antecubital;                                wh  

22:37 Follow up: Response: No adverse reaction; Pain is decreased                             aa1 

21:39 Drug: Reglan 10 mg Route: IVP; Site: right antecubital;                                 wh  

22:39 Follow up: Response: No adverse reaction; Marked relief of symptoms                     aa1 

21:41 Drug: Zofran 4 mg Route: IVP; Site: right antecubital;                                    

22:39 Follow up: Response: No adverse reaction; Marked relief of symptoms                     aa1 

                                                                                                  

                                                                                                  

Disposition:                                                                                      

20 23:05 Discharged to Home. Impression: Weakness.                                          

- Condition is Stable.                                                                            

- Discharge Instructions: Weakness, Easy-to-Read.                                                 

- Prescriptions for Reglan 10 mg Oral Tablet - take 1 tablet by ORAL route every 6                

  hours . take 30 minutes before meals and at bedtime; 100 tablet.                                

- Medication Reconciliation Form, Thank You Letter, Antibiotic Education, Prescription            

  Opioid Use form.                                                                                

- Follow up: Private Physician; When: Tomorrow; Reason: Continuance of care.                      

                                                                                                  

                                                                                                  

                                                                                                  

Signatures:                                                                                       

Dispatcher [employer]                           Yanet Jiménez RN                     RN   aj1                                                  

Carolina Rasmussen RN                  RN   aa1                                                  

Srinath Javier                                                                                   

Alena Hernandez MD MD   ma2                                                  

                                                                                                  

Corrections: (The following items were deleted from the chart)                                    

23:32 23:05 2020 23:05 Discharged to Home. Impression: Weakness. Condition is Stable.   aa1 

      Prescriptions for Reglan 10 mg Oral Tablet - take 1 tablet by ORAL route every 6 hours      

      . take 30 minutes before meals and at bedtime; 100 tablet. and Forms are Medication         

      Reconciliation Form, Thank You Letter, Antibiotic Education, Prescription Opioid Use.       

      Follow up: Private Physician; When: Tomorrow; Reason: Continuance of care. ma2              

                                                                                                  

**************************************************************************************************

## 2020-01-12 NOTE — ER
Nurse's Notes                                                                                     

 [facility]                                                                 

Name: Lata Lal                                                                             

Age: 64 yrs                                                                                       

Sex: Female                                                                                       

: 1955                                                                                   

MRN: [id number]                                                                                   

Arrival Date: 2020                                                                          

Time: 19:43                                                                                       

Account#: [id number]                                                                            

Bed 14                                                                                            

Private MD:                                                                                       

Diagnosis: Weakness                                                                               

                                                                                                  

Presentation:                                                                                     

                                                                                             

19:46 Presenting complaint: Patient states: "I woke up having to go to the bathroom this      aj1 

      morning, I got up, and I fell into the wall, and I asked my  to help me to the       

      bathroom and then I got back in bed. I feel like I don't have any strength. I have a        

      lot of excruciating pain because both of my rotator cuffs need to be done." Patient         

      also reports headache, generalized weakness. Denies any unilateral weakness, trouble        

      speaking. Patient ambulated to triage with a steady gait. Transition of care: patient       

      was not received from another setting of care. Onset of symptoms was 2020.      

      Risk Assessment: Do you want to hurt yourself or someone else? Patient reports no           

      desire to harm self or others. Initial Sepsis Screen: Does the patient meet any 2           

      criteria? No. Patient's initial sepsis screen is negative. Does the patient have a          

      suspected source of infection? No. Patient's initial sepsis screen is negative. Care        

      prior to arrival: None.                                                                     

19:46 Method Of Arrival: Ambulatory                                                           aj1 

19:46 Acuity: BRO 3                                                                           aj1 

                                                                                                  

Triage Assessment:                                                                                

19:51 Headache History: Denies prior headaches. General: Appears in no apparent distress.     aj1 

      comfortable, Behavior is cooperative, drowsy. Pain: Complains of pain in occipital area     

      Pain currently is 9 out of 10 on a pain scale. Pain began today. Neuro: Level of            

      Consciousness is awake, alert, obeys commands. Cardiovascular: Patient's skin is warm       

      and dry. Respiratory: Airway is patent Respiratory effort is even, unlabored,               

      Respiratory pattern is regular, symmetrical.                                                

                                                                                                  

Historical:                                                                                       

- Allergies:                                                                                      

19:51 Morphine;                                                                               aj1 

19:51 Tramadol HCl;                                                                           aj1 

- Home Meds:                                                                                      

19:51 Lisinopril Oral [Active]; insulin [Active];                                             aj1 

- PMHx:                                                                                           

19:51 Diabetes; Hypertension;                                                                 aj1 

                                                                                                  

- Immunization history:: Flu vaccine is not up to date.                                           

- Social history:: Smoking status: Patient/guardian denies using tobacco.                         

- Ebola Screening: : Patient denies travel to an Ebola-affected area in the 21 days               

  before illness onset.                                                                           

- Family history:: not pertinent.                                                                 

                                                                                                  

                                                                                                  

Screenin:10 Abuse screen: Denies threats or abuse. Denies injuries from another. Nutritional        aa1 

      screening: No deficits noted. Tuberculosis screening: No symptoms or risk factors           

      identified. Fall Risk None identified.                                                      

                                                                                                  

Assessment:                                                                                       

20:10 General: Appears in no apparent distress. uncomfortable, Behavior is calm, cooperative, aa1 

      appropriate for age. Pain: Complains of pain in forehead and scalp and occipital area       

      Quality of pain is described as aching, throbbing, Is continuous. Neuro: Level of           

      Consciousness is awake, alert, obeys commands, Oriented to person, place, time,             

      situation, Moves all extremities. Gait is steady, Speech is normal, Facial symmetry         

      appears normal, Reports headache frontal area, occipital area, weakness.                    

      Cardiovascular: Denies chest pain, diaphoresis, palpitations, shortness of breath.          

      Respiratory: Airway is patent Respiratory effort is even, unlabored, Respiratory            

      pattern is regular, symmetrical. GI: Reports nausea, vomiting. : No signs and/or          

      symptoms were reported regarding the genitourinary system. EENT: No signs and/or            

      symptoms were reported regarding the EENT system. Derm: Skin is intact, is healthy with     

      good turgor, Skin is pink, warm \T\ dry. Musculoskeletal: Circulation, motion, and          

      sensation intact. Capillary refill < 3 seconds.                                             

21:07 Reassessment: Patient appears in no apparent distress at this time. Patient and/or      aa1 

      family updated on plan of care and expected duration. Pain level reassessed. Patient is     

      alert, oriented x 3, equal unlabored respirations, skin warm/dry/pink. Pt taken to CT       

      at this time.                                                                               

23:28 Reassessment: Patient appears in no apparent distress at this time. Patient is alert,   aa1 

      oriented x 3, equal unlabored respirations, skin warm/dry/pink. Discussed d/c \T\ f/u       

      instructions with pt; denies questions or concerns at this time. Ambulatory to lobby        

      with steady gait. Patient denies pain at this time. Patient states feeling better.          

                                                                                                  

Vital Signs:                                                                                      

19:51  / 79; Pulse 76; Resp 18; Temp 97.3; Pulse Ox 98% on R/A; Weight 68.04 kg (R);    aj1 

      Height 5 ft. 2 in. (157.48 cm) (R); Pain 9/10;                                              

21:00  / 78; Pulse 79; Resp 18; Pulse Ox 99% on R/A; Pain 9/10;                         aa1 

22:00  / 71; Pulse 82; Resp 18; Pulse Ox 99% on R/A;                                    aa1 

23:28  / 77; Pulse 87; Resp 16; Temp 97.2; Pulse Ox 100% on R/A; Pain 0/10;             aa1 

19:51 Body Mass Index 27.44 (68.04 kg, 157.48 cm)                                             aj1 

                                                                                                  

Sterling Coma Score:                                                                               

20:26 Eye Response: spontaneous(4). Verbal Response: oriented(5). Motor Response: obeys       ma2 

      commands(6). Total: 15.                                                                     

                                                                                                  

ED Course:                                                                                        

19:43 Patient arrived in ED.                                                                  jg7 

19:50 Triage completed.                                                                       aj1 

19:51 Arm band placed on Patient placed in an exam room.                                      aj1 

20:10 Patient has correct armband on for positive identification. Bed in low position. Call   aa1 

      light in reach. Pulse ox on. NIBP on. Warm blanket given.                                   

20:12 Alena Hernandez MD is Attending Physician.                                           ma2 

20:31 Carolina Rasmussen RN is Primary Nurse.                                                aa1 

21:00 Missed attempt(s): 20 gauge in right antecubital area. Bleeding controlled, band aid    ds4 

      applied, catheter tip intact.                                                               

21:05 Missed attempt(s): 22 gauge in left antecubital area. Bleeding controlled, band aid     ds4 

      applied, catheter tip intact.                                                               

21:07 Missed attempt(s): 22 gauge in left forearm. Bleeding controlled, band aid applied,     aa1 

      catheter tip intact.                                                                        

21:22 CT completed. Patient tolerated procedure well. Patient moved back from CT.             bq  

21:35 Inserted saline lock: 22 gauge in right antecubital area, using aseptic technique.      aa1 

      Blood collected.                                                                            

21:46 CT Head Brain wo Cont In Process Unspecified.                                           EDMS

21:48 Chest Single View XRAY In Process Unspecified.                                          EDMS

23:29 No provider procedures requiring assistance completed. IV discontinued, intact,         aa1 

      bleeding controlled, No redness/swelling at site. Pressure dressing applied.                

                                                                                                  

Administered Medications:                                                                         

21:35 Drug: NS 0.9% 1000 ml Route: IV; Rate: 1 bolus; Site: right antecubital;                  

22:36 Follow up: IV Status: Completed infusion; IV Intake: 1000ml                             aa1 

21:37 Drug: TORadol 30 mg Route: IVP; Site: right antecubital;                                  

22:37 Follow up: Response: No adverse reaction; Pain is decreased                             aa1 

21:39 Drug: Reglan 10 mg Route: IVP; Site: right antecubital;                                   

22:39 Follow up: Response: No adverse reaction; Marked relief of symptoms                     aa1 

21:41 Drug: Zofran 4 mg Route: IVP; Site: right antecubital;                                    

22:39 Follow up: Response: No adverse reaction; Marked relief of symptoms                     aa1 

                                                                                                  

                                                                                                  

Intake:                                                                                           

22:36 IV: 1000ml; Total: 1000ml.                                                              aa1 

                                                                                                  

Outcome:                                                                                          

23:05 Discharge ordered by MD.                                                                ma2 

23:32 Discharged to home ambulatory, with significant other.                                  aa1 

23:32 Condition: good                                                                             

23:32 Discharge instructions given to patient, significant other, Instructed on discharge         

      instructions, follow up and referral plans. medication usage, Demonstrated                  

      understanding of instructions, follow-up care, medications, Prescriptions given X 1.        

23:32 Patient left the ED.                                                                    aa1 

                                                                                                  

Signatures:                                                                                       

Dispatcher [employer]                           EDMS                                                 

Yanet Ryan RN                     RN   Carolina Koch RN                  RN   aa1                                                  

Gala Parks Donovan                             ds4                                                  

Srinath Javier Mohammad, MD MD   ma2                                                  

Hayde Chandra                           jg7                                                  

                                                                                                  

Corrections: (The following items were deleted from the chart)                                    

23:31 21:40 Inserted saline lock: 22 gauge in right antecubital area, using aseptic           aa1 

      technique. Blood collected. aa1                                                             

                                                                                                  

**************************************************************************************************

## 2020-01-13 NOTE — RAD REPORT
EXAM DESCRIPTION:  CT - Head Brain Wo Cont - 1/12/2020 10:33 pm

 

CLINICAL HISTORY:  64 years Female WEAKNESS

 

COMPARISON:  None

 

TECHNIQUE:  Contiguous axial images of the brain were obtained without the administration of intraven
ous contrast.This exam was performed according to our departmental dose-optimization program which in
cludes use of Automated Exposure Control, adjustment of the mA and/or kV according to patient size an
d/or use of iterative reconstruction technique.

DLP:  817 mGy*cm

 

FINDINGS:  Brain: No acute intracranial hemorrhage. No extra-axial collection. No mass effect or price
iation.   Mild prominence of the sulci and cisterns. Confluent periventricular and subcortical white 
matter hypodensity is noted.

Ventricles: Within normal limits in size.

Globes and orbits: No acute abnormality.

Bones: No acute osseous finding

Paranasal sinuses: Paranasal sinuses are clear.

Mastoid air cells: Well pneumatized.

Soft tissues: Within normal limits

 

IMPRESSION:  No acute intracranial hemorrhage, hydrocephalus or herniation.

And mild cerebral volume loss and chronic small vessel ischemic changes. If persistent clinical checo
rn for acute ischemia, consider MRI brain without contrast for further evaluation.

 

Electronically signed by:   Lucas Martinez DO   1/12/2020 10:14 PM CST Workstation: [id number]

 

 

Due to temporary technical issues with the PACS/Fluency reporting system, reports are being signed by
 the in house radiologist as a courtesy to ensure prompt reporting. The interpreting radiologist is f
ully responsible for the content of the report.

## 2020-01-13 NOTE — RAD REPORT
EXAM DESCRIPTION:  Rah Single View1/12/2020 9:48 pm

 

CLINICAL HISTORY:  Cough

 

COMPARISON:  June 2019

 

FINDINGS:   The lungs appear clear of acute infiltrate. The heart is normal size

 

IMPRESSION:   No acute abnormalities displayed